# Patient Record
Sex: FEMALE | Race: WHITE | NOT HISPANIC OR LATINO | Employment: FULL TIME | URBAN - METROPOLITAN AREA
[De-identification: names, ages, dates, MRNs, and addresses within clinical notes are randomized per-mention and may not be internally consistent; named-entity substitution may affect disease eponyms.]

---

## 2017-07-05 ENCOUNTER — HISTORICAL (OUTPATIENT)
Dept: ADMINISTRATIVE | Facility: HOSPITAL | Age: 27
End: 2017-07-05

## 2018-06-06 ENCOUNTER — HISTORICAL (OUTPATIENT)
Dept: ADMINISTRATIVE | Facility: HOSPITAL | Age: 28
End: 2018-06-06

## 2018-06-06 LAB
ABS NEUT (OLG): 7.33 X10(3)/MCL (ref 2.1–9.2)
BASOPHILS # BLD AUTO: 0 X10(3)/MCL (ref 0–0.2)
BASOPHILS NFR BLD AUTO: 0 %
EOSINOPHIL # BLD AUTO: 0 X10(3)/MCL (ref 0–0.9)
EOSINOPHIL NFR BLD AUTO: 0 %
ERYTHROCYTE [DISTWIDTH] IN BLOOD BY AUTOMATED COUNT: 12.9 % (ref 11.5–17)
FT4I SERPL CALC-MCNC: 3.35 (ref 2.6–3.6)
GROUP & RH: NORMAL
HBV SURFACE AG SERPL QL IA: NEGATIVE
HCT VFR BLD AUTO: 40.4 % (ref 37–47)
HGB BLD-MCNC: 13 GM/DL (ref 12–16)
HIV 1+2 AB+HIV1 P24 AG SERPL QL IA: NEGATIVE
LYMPHOCYTES # BLD AUTO: 1.8 X10(3)/MCL (ref 0.6–4.6)
LYMPHOCYTES NFR BLD AUTO: 18 %
MCH RBC QN AUTO: 28.3 PG (ref 27–31)
MCHC RBC AUTO-ENTMCNC: 32.2 GM/DL (ref 33–36)
MCV RBC AUTO: 88 FL (ref 80–94)
MONOCYTES # BLD AUTO: 0.6 X10(3)/MCL (ref 0.1–1.3)
MONOCYTES NFR BLD AUTO: 7 %
NEUTROPHILS # BLD AUTO: 7.33 X10(3)/MCL (ref 1.4–7.9)
NEUTROPHILS NFR BLD AUTO: 74 %
PAP RECOMMENDATION EXT: NORMAL
PAP SMEAR: NORMAL
PLATELET # BLD AUTO: 264 X10(3)/MCL (ref 130–400)
PMV BLD AUTO: 10 FL (ref 9.4–12.4)
RBC # BLD AUTO: 4.59 X10(6)/MCL (ref 4.2–5.4)
RPR SER QL: NORMAL
T3RU NFR SERPL: 31 % (ref 31–39)
T4 FREE SERPL-MCNC: 0.94 NG/DL (ref 0.76–1.46)
T4 SERPL-MCNC: 10.8 MCG/DL (ref 4.7–13.3)
TSH SERPL-ACNC: 3.28 MIU/L (ref 0.36–3.74)
WBC # SPEC AUTO: 9.9 X10(3)/MCL (ref 4.5–11.5)

## 2018-06-08 LAB — FINAL CULTURE: NO GROWTH

## 2018-07-05 ENCOUNTER — HISTORICAL (OUTPATIENT)
Dept: ADMINISTRATIVE | Facility: HOSPITAL | Age: 28
End: 2018-07-05

## 2018-07-05 LAB — GROUP & RH: NORMAL

## 2018-08-28 ENCOUNTER — HISTORICAL (OUTPATIENT)
Dept: RADIOLOGY | Facility: HOSPITAL | Age: 28
End: 2018-08-28

## 2018-10-24 ENCOUNTER — HISTORICAL (OUTPATIENT)
Dept: LAB | Facility: HOSPITAL | Age: 28
End: 2018-10-24

## 2018-10-24 LAB
ERYTHROCYTE [DISTWIDTH] IN BLOOD BY AUTOMATED COUNT: 12.5 % (ref 11.5–17)
GLUCOSE 1H P 100 G GLC PO SERPL-MCNC: 135 MG/DL (ref 100–180)
HCT VFR BLD AUTO: 33 % (ref 37–47)
HGB BLD-MCNC: 10.7 GM/DL (ref 12–16)
MCH RBC QN AUTO: 28.9 PG (ref 27–31)
MCHC RBC AUTO-ENTMCNC: 32.4 GM/DL (ref 33–36)
MCV RBC AUTO: 89.2 FL (ref 80–94)
PLATELET # BLD AUTO: 219 X10(3)/MCL (ref 130–400)
PMV BLD AUTO: 10.5 FL (ref 9.4–12.4)
RBC # BLD AUTO: 3.7 X10(6)/MCL (ref 4.2–5.4)
WBC # SPEC AUTO: 10.1 X10(3)/MCL (ref 4.5–11.5)

## 2018-11-01 ENCOUNTER — HOSPITAL ENCOUNTER (OUTPATIENT)
Dept: OBSTETRICS AND GYNECOLOGY | Facility: HOSPITAL | Age: 28
End: 2018-11-01
Attending: OBSTETRICS & GYNECOLOGY | Admitting: OBSTETRICS & GYNECOLOGY

## 2018-12-23 ENCOUNTER — HOSPITAL ENCOUNTER (OUTPATIENT)
Dept: OBSTETRICS AND GYNECOLOGY | Facility: HOSPITAL | Age: 28
End: 2018-12-23
Attending: OBSTETRICS & GYNECOLOGY | Admitting: OBSTETRICS & GYNECOLOGY

## 2019-01-03 ENCOUNTER — HISTORICAL (OUTPATIENT)
Dept: LAB | Facility: HOSPITAL | Age: 29
End: 2019-01-03

## 2019-01-05 LAB — FINAL CULTURE: NORMAL

## 2021-04-25 ENCOUNTER — HISTORICAL (OUTPATIENT)
Dept: LAB | Facility: HOSPITAL | Age: 31
End: 2021-04-25

## 2021-04-25 LAB — SARS-COV-2 AG RESP QL IA.RAPID: POSITIVE

## 2021-09-09 ENCOUNTER — HISTORICAL (OUTPATIENT)
Dept: LAB | Facility: HOSPITAL | Age: 31
End: 2021-09-09

## 2021-09-09 LAB
ABS NEUT (OLG): 4.88 X10(3)/MCL (ref 2.1–9.2)
B-HCG SERPL QL: NEGATIVE
BASOPHILS # BLD AUTO: 0.05 X10(3)/MCL (ref 0–0.2)
BASOPHILS NFR BLD AUTO: 0.7 % (ref 0–1)
BUN SERPL-MCNC: 12.6 MG/DL (ref 7–18.7)
CALCIUM SERPL-MCNC: 9.2 MG/DL (ref 8.4–10.2)
CHLORIDE SERPL-SCNC: 105 MMOL/L (ref 98–107)
CO2 SERPL-SCNC: 23 MMOL/L (ref 22–29)
CREAT SERPL-MCNC: 0.74 MG/DL (ref 0.57–1.11)
CREAT/UREA NIT SERPL: 17
EOSINOPHIL # BLD AUTO: 0.07 X10(3)/MCL (ref 0–0.9)
EOSINOPHIL NFR BLD AUTO: 1 % (ref 0–6.4)
ERYTHROCYTE [DISTWIDTH] IN BLOOD BY AUTOMATED COUNT: 14 % (ref 11.5–17)
GLUCOSE SERPL-MCNC: 86 MG/DL (ref 74–100)
HCT VFR BLD AUTO: 36.3 % (ref 37–47)
HGB BLD-MCNC: 11.1 GM/DL (ref 12–16)
IMM GRANULOCYTES # BLD AUTO: 0.01 10*3/UL (ref 0–0.02)
IMM GRANULOCYTES NFR BLD AUTO: 0.1 % (ref 0–0.43)
LYMPHOCYTES # BLD AUTO: 1.82 X10(3)/MCL (ref 0.6–4.6)
LYMPHOCYTES NFR BLD AUTO: 25.1 % (ref 16–44)
MCH RBC QN AUTO: 24.8 PG (ref 27–31)
MCHC RBC AUTO-ENTMCNC: 30.6 GM/DL (ref 33–36)
MCV RBC AUTO: 81 FL (ref 80–94)
MONOCYTES # BLD AUTO: 0.42 X10(3)/MCL (ref 0.1–1.3)
MONOCYTES NFR BLD AUTO: 5.8 % (ref 4–12.1)
NEUTROPHILS # BLD AUTO: 4.88 X10(3)/MCL (ref 2.1–9.2)
NEUTROPHILS NFR BLD AUTO: 67.3 % (ref 43–73)
NRBC BLD AUTO-RTO: 0 % (ref 0–0.2)
PLATELET # BLD AUTO: 306 X10(3)/MCL (ref 130–400)
PMV BLD AUTO: 9.9 FL (ref 7.4–10.4)
POTASSIUM SERPL-SCNC: 3.5 MMOL/L (ref 3.5–5.1)
RBC # BLD AUTO: 4.48 X10(6)/MCL (ref 4.2–5.4)
SARS-COV-2 AG RESP QL IA.RAPID: NEGATIVE
SODIUM SERPL-SCNC: 139 MMOL/L (ref 136–145)
WBC # SPEC AUTO: 7.2 X10(3)/MCL (ref 4.5–11.5)

## 2021-09-10 ENCOUNTER — HISTORICAL (OUTPATIENT)
Dept: SURGERY | Facility: HOSPITAL | Age: 31
End: 2021-09-10

## 2021-09-20 ENCOUNTER — HISTORICAL (OUTPATIENT)
Dept: ADMINISTRATIVE | Facility: HOSPITAL | Age: 31
End: 2021-09-20

## 2021-10-18 ENCOUNTER — HISTORICAL (OUTPATIENT)
Dept: ADMINISTRATIVE | Facility: HOSPITAL | Age: 31
End: 2021-10-18

## 2021-11-15 ENCOUNTER — HISTORICAL (OUTPATIENT)
Dept: ADMINISTRATIVE | Facility: HOSPITAL | Age: 31
End: 2021-11-15

## 2022-03-17 ENCOUNTER — HISTORICAL (OUTPATIENT)
Dept: LAB | Facility: HOSPITAL | Age: 32
End: 2022-03-17

## 2022-03-17 LAB
ABS NEUT (OLG): 5.51 (ref 2.1–9.2)
ALBUMIN SERPL-MCNC: 4 G/DL (ref 3.5–5)
ALBUMIN/GLOB SERPL: 1.4 {RATIO} (ref 1.1–2)
ALP SERPL-CCNC: 61 U/L (ref 40–150)
ALT SERPL-CCNC: 10 U/L (ref 0–55)
AST SERPL-CCNC: 12 U/L (ref 5–34)
BASOPHILS # BLD AUTO: 0.1 10*3/UL (ref 0–0.2)
BASOPHILS NFR BLD AUTO: 1 %
BILIRUB SERPL-MCNC: 0.4 MG/DL
BILIRUBIN DIRECT+TOT PNL SERPL-MCNC: 0.2 (ref 0–0.5)
BILIRUBIN DIRECT+TOT PNL SERPL-MCNC: 0.2 (ref 0–0.8)
BUN SERPL-MCNC: 8.2 MG/DL (ref 7–18.7)
CALCIUM SERPL-MCNC: 9.4 MG/DL (ref 8.7–10.5)
CHLORIDE SERPL-SCNC: 106 MMOL/L (ref 98–107)
CHOLEST SERPL-MCNC: 195 MG/DL
CHOLEST/HDLC SERPL: 5 {RATIO} (ref 0–5)
CO2 SERPL-SCNC: 27 MMOL/L (ref 22–29)
CREAT SERPL-MCNC: 0.68 MG/DL (ref 0.55–1.02)
EOSINOPHIL # BLD AUTO: 0.1 10*3/UL (ref 0–0.9)
EOSINOPHIL NFR BLD AUTO: 1 %
ERYTHROCYTE [DISTWIDTH] IN BLOOD BY AUTOMATED COUNT: 13.7 % (ref 11.5–17)
GLOBULIN SER-MCNC: 2.9 G/DL (ref 2.4–3.5)
GLUCOSE SERPL-MCNC: 74 MG/DL (ref 74–100)
HCT VFR BLD AUTO: 35 % (ref 37–47)
HDLC SERPL-MCNC: 40 MG/DL (ref 35–60)
HEMOLYSIS INTERF INDEX SERPL-ACNC: 1
HGB BLD-MCNC: 10.6 G/DL (ref 12–16)
ICTERIC INTERF INDEX SERPL-ACNC: 0
IRON SATN MFR SERPL: NORMAL % (ref 20–50)
IRON SERPL-MCNC: <5 UG/DL (ref 50–170)
LDLC SERPL CALC-MCNC: 132 MG/DL (ref 50–140)
LIPEMIC INTERF INDEX SERPL-ACNC: 4
LYMPHOCYTES # BLD AUTO: 3 10*3/UL (ref 0.6–4.6)
LYMPHOCYTES NFR BLD AUTO: 32 %
MANUAL DIFF? (OHS): NO
MCH RBC QN AUTO: 24.5 PG (ref 27–31)
MCHC RBC AUTO-ENTMCNC: 30.3 G/DL (ref 33–36)
MCV RBC AUTO: 80.8 FL (ref 80–94)
MONOCYTES # BLD AUTO: 0.7 10*3/UL (ref 0.1–1.3)
MONOCYTES NFR BLD AUTO: 8 %
NEUTROPHILS # BLD AUTO: 5.51 10*3/UL (ref 1.4–7.9)
NEUTROPHILS NFR BLD AUTO: 59 %
PLATELET # BLD AUTO: 411 10*3/UL (ref 130–400)
PMV BLD AUTO: 10.5 FL (ref 9.4–12.4)
POTASSIUM SERPL-SCNC: 4 MMOL/L (ref 3.5–5.1)
PROT SERPL-MCNC: 6.9 G/DL (ref 6.4–8.3)
RBC # BLD AUTO: 4.33 10*6/UL (ref 4.2–5.4)
SODIUM SERPL-SCNC: 140 MMOL/L (ref 136–145)
T3FREE SERPL-MCNC: 3.03 PG/ML (ref 1.58–3.91)
T4 FREE SERPL-MCNC: 0.91 NG/DL (ref 0.7–1.48)
TIBC SERPL-MCNC: 349 UG/DL (ref 70–310)
TIBC SERPL-MCNC: <354 UG/DL (ref 250–450)
TRANSFERRIN SERPL-MCNC: 346 MG/DL (ref 180–382)
TRIGL SERPL-MCNC: 114 MG/DL (ref 37–140)
TSH SERPL-ACNC: 3.52 M[IU]/L (ref 0.35–4.94)
VLDLC SERPL CALC-MCNC: 23 MG/DL
WBC # SPEC AUTO: 9.3 10*3/UL (ref 4.5–11.5)

## 2022-03-23 ENCOUNTER — HISTORICAL (OUTPATIENT)
Dept: INFUSION THERAPY | Facility: HOSPITAL | Age: 32
End: 2022-03-23

## 2022-03-31 ENCOUNTER — HISTORICAL (OUTPATIENT)
Dept: LAB | Facility: HOSPITAL | Age: 32
End: 2022-03-31

## 2022-03-31 LAB
DEPRECATED CALCIDIOL+CALCIFEROL SERPL-MC: 27.7 NG/ML (ref 30–80)
IRON SATN MFR SERPL: 15 % (ref 20–50)
IRON SERPL-MCNC: 48 UG/DL (ref 50–170)
TIBC SERPL-MCNC: 271 UG/DL (ref 70–310)
TIBC SERPL-MCNC: 319 UG/DL (ref 250–450)
TRANSFERRIN SERPL-MCNC: 282 MG/DL (ref 180–382)

## 2022-04-04 ENCOUNTER — HISTORICAL (OUTPATIENT)
Dept: INFUSION THERAPY | Facility: HOSPITAL | Age: 32
End: 2022-04-04

## 2022-04-10 ENCOUNTER — HISTORICAL (OUTPATIENT)
Dept: ADMINISTRATIVE | Facility: HOSPITAL | Age: 32
End: 2022-04-10

## 2022-04-29 VITALS
BODY MASS INDEX: 29.38 KG/M2 | WEIGHT: 187.19 LBS | OXYGEN SATURATION: 97 % | SYSTOLIC BLOOD PRESSURE: 105 MMHG | HEIGHT: 67 IN | DIASTOLIC BLOOD PRESSURE: 75 MMHG

## 2022-05-04 NOTE — HISTORICAL [facility]
This is a historical note converted from [facility]. Formatting and pictures may have been removed.  Please reference Mckay for original formatting and attached multimedia. Chief Complaint  4 week f/u ORIF R distal radius 9/10/2021, pt states pain is better a little, no other changes  History of Present Illness  Patient returns today for repeat exam. ?Patient is 6 weeks from her right distal radius fracture fixation.? Patient states the pain is better she is coming out of her Velcro splint. ?She continues to have some difficulty with supination  Physical Exam  Vitals & Measurements  HT:?170.00?cm? WT:?84.900?kg? BMI:?29.38?  Right upper extremity compartment soft and warm. ?Skin is intact. ?There are no signs or symptoms of DVT or infection. ?Her incisions well-healed there is no obvious swelling or erythema.? She has some tenderness along the dorsal aspect of the wrist joint. ?There is no obvious swelling or erythema?he does have some stiffness with full supination, she does have full pronation.? There is no instability, neurovascular tact distally. ?X-rays 3 views the right wrist demonstrate a healed distal radius fracture with hardware in appropriate position.  Assessment/Plan  1.?Distal radius fracture, right?S52.501A  ?At this time discussed her physical exam and x-ray findings. ?She has healed her fracture nicely. ?We will start formal therapy?to regain her full strength and motion. 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 ?I like to see her back in 4 weeks to see how she is progressing.  Ordered:  Clinic Follow up, *Est. 11/15/21 3:00:00 CST, Order for future visit, Distal radius fracture, right, [facility]  Clinic Follow-up PRN, 10/18/21 8:56:00 CDT, Future Order, [facility]  Post-Op follow-up visit [id number] PC, Distal radius fracture, right, [facility], 10/18/21 8:56:00 CDT  ?  Referrals  PT/OT Ambulatory  Referral, Specialty: Physical Therapy, Start: 10/18/21 8:59:00 CDT, 4, Evaluate and Treat, Distal radius fracture, right, 3 X Week  Clinic Follow up, *Est. 11/15/21 3:00:00 CST, Order for future visit, Distal radius fracture, right, [facility]  Clinic Follow-up PRN, 10/18/21 8:56:00 CDT, Future Order, [facility]   Problem List/Past Medical History  Ongoing  2019-nCoV  Historical  Hypothyroid  Hypothyroidism  Miscarriage  NORMAL PREGNANCY  Pregnant  Pregnant  Pregnant  Pregnant  Procedure/Surgical History  Injection(s), anesthetic agent(s) and/or steroid; axillary nerve (09/10/2021)  Introduction of Anesthetic Agent into Peripheral Nerves and Plexi, Percutaneous Approach (09/10/2021)  Open treatment of distal radial extra-articular fracture or epiphyseal separation, with internal fixation (09/10/2021)  ORIF Radius Distal (Right) (09/10/2021)  Reposition Right Radius with Internal Fixation Device, Open Approach (09/10/2021)  Closed treatment of distal radial fracture (eg, Colles or Smith type) or epiphyseal separation, includes closed treatment of fracture of ulnar styloid, when performed; with manipulation (2021)  Reposition Right Radius, External Approach (2021)   delivery only; (2019)   Section (2019)  Extraction of Products of Conception, Low, Open Approach (2019)  Medical induction of labor (2015)  Repair of other current obstetric laceration (2015)  DIRECT ADMISSION OF PATIENT FOR HOSPITAL OBSERVATION CARE (2015)  HOSPITAL OBSERVATION SERVICE, PER HOUR (2015)  DIRECT ADMISSION OF PATIENT FOR HOSPITAL OBSERVATION CARE (2014)  HOSPITAL OBSERVATION SERVICE, PER HOUR (2014)  DIRECT ADMISSION OF PATIENT FOR HOSPITAL OBSERVATION CARE (2014)  HOSPITAL OBSERVATION SERVICE, PER HOUR (2014)  Repair of other current obstetric laceration (2013)  DIRECT ADMISSION OF PATIENT FOR HOSPITAL OBSERVATION CARE  (2013)  HOSPITAL OBSERVATION SERVICE, PER HOUR (2013)   (2013)  dilation and cutterage (2012)  Aspiration curettage following delivery or  (2012)  HOSPITAL OBSERVATION SERVICE, PER HOUR (2012)  Treatment of missed , completed surgically; first trimester. (2012)  D&C ()   Medications  Normal Saline Infusion, 1000 mL, IV, Now  Percocet 5/325 oral tablet, 1 tab(s), Oral, q6hr, PRN,? ?Not taking  Allergies  No Known Allergies  Social History  Abuse/Neglect  No, No, Yes, 10/18/2021  Alcohol - Denies Alcohol Use, 2012  Employment/School - Low Risk, 2015  Substance Use - Denies Substance Abuse, 2012  Tobacco - Denies Tobacco Use, 2012  Never (less than 100 in lifetime), N/A, 10/18/2021  Family History  Hypothyroidism: Mother.  Hypothyroidism.: Mother.  Pituitary tumor: Sister.  Immunizations  Vaccine Date Status   COVID-19 MRNA, LNP-S, PF- Pfizer 2021 Given   COVID-19 MRNA, LNP-S, PF- Pfizer 2021 Given   influenza virus vaccine, inactivated 10/03/2019 Given   Health Maintenance  Health Maintenance  ???Pending?(in the next year)  ??? ??OverDue  ??? ? ? ?Blood Pressure Screening due??19??and every 1??year(s)  ??? ? ? ?TB Skin Test due??10/05/20??and every 1??year(s)  ??? ? ? ?Alcohol Misuse Screening due??21??and every 1??year(s)  ??? ? ? ?Cervical Cancer Screening due??21??and every 3??year(s)  ??? ??Due?  ??? ? ? ?ADL Screening due??10/18/21??and every 1??year(s)  ??? ? ? ?Tetanus Vaccine due??10/18/21??and every 10??year(s)  ??? ??Due In Future?  ??? ? ? ?Obesity Screening not due until??01/01/22??and every 1??year(s)  ??? ? ? ?Depression Screening not due until??22??and every 1??year(s)  ??? ? ? ?Body Mass Index Check not due until??22??and every 1??year(s)  ???Satisfied?(in the past 1 year)  ??? ??Satisfied?  ??? ? ? ?Blood Pressure Screening on??09/10/21.??Satisfied by Diane Ruff RN  ??? ? ?  ?Body Mass Index Check on??10/18/21.??Satisfied by Nina Dang  ??? ? ? ?Depression Screening on??10/18/21.??Satisfied by Nina Dang  ??? ? ? ?Diabetes Screening on??09/09/21.??Satisfied by Jess Wright  ??? ? ? ?Influenza Vaccine on??10/18/21.??Satisfied by Nina Dang  ??? ? ? ?Obesity Screening on??10/18/21.??Satisfied by Nina Dang  ?

## 2022-05-04 NOTE — HISTORICAL [facility]
This is a historical note converted from [facility]. Formatting and pictures may have been removed.  Please reference M[facility]ay for original formatting and attached multimedia. Chief Complaint  f/u ORIF R distal radius 9/10/2021- GL 12/9/2021 - pt states that her wrist is doing better - td  History of Present Illness  Patient returns today for repeat exam. ?Patient is 2 months from her right distal radius fracture fixation. ?Patient states she is doing well?very minimal, occasional pain.? She is not take any pain medications. ?She has been out of her splint.  Physical Exam  Vitals & Measurements  HT:?170.00?cm? WT:?84.900?kg? BMI:?29.38?  Right wrist her incisions well-healed there is no signs or symptoms of DVT or infection. ?She has full pronation. ?She is a little bit short of full?supination. ?There is no crepitance,?she has?otherwise?able to make a full fist, full extension, neurovascular tact distally. ?X-rays 3 views right wrist demonstrate a healed distal radius fracture, hardware in appropriate position.  Assessment/Plan  1.?Distal radius fracture, right?S52.501A  ?At this time we discussed her physical exam and x-ray findings. ?She is healed nicely she will continue weight-bear as tolerated, I would like to see her back with any problems or difficulties.  Ordered:  Clinic Follow-up PRN, 11/15/21 8:44:00 CST, Future Order, [facility]  Post-Op follow-up visit [id number] PC, Distal radius fracture, right, [facility], 11/15/21 8:44:00 CST  ?  Referrals  Clinic Follow-up PRN, 11/15/21 8:44:00 CST, Future Order, [facility]   Problem List/Past Medical History  Ongoing  2019-nCoV  Historical  Hypothyroid  Hypothyroidism  Miscarriage  NORMAL PREGNANCY  Pregnant  Pregnant  Pregnant  Pregnant  Procedure/Surgical History  Injection(s), anesthetic agent(s) and/or steroid; axillary nerve (09/10/2021)  Introduction of Anesthetic Agent into Peripheral Nerves and Plexi, Percutaneous Approach (09/10/2021)  Open  treatment of distal radial extra-articular fracture or epiphyseal separation, with internal fixation (09/10/2021)  ORIF Radius Distal (Right) (09/10/2021)  Reposition Right Radius with Internal Fixation Device, Open Approach (09/10/2021)  Closed treatment of distal radial fracture (eg, Colles or Smith type) or epiphyseal separation, includes closed treatment of fracture of ulnar styloid, when performed; with manipulation (2021)  Reposition Right Radius, External Approach (2021)   delivery only; (2019)   Section (2019)  Extraction of Products of Conception, Low, Open Approach (2019)  Medical induction of labor (2015)  Repair of other current obstetric laceration (2015)  DIRECT ADMISSION OF PATIENT FOR HOSPITAL OBSERVATION CARE (2015)  HOSPITAL OBSERVATION SERVICE, PER HOUR (2015)  DIRECT ADMISSION OF PATIENT FOR HOSPITAL OBSERVATION CARE (2014)  HOSPITAL OBSERVATION SERVICE, PER HOUR (2014)  DIRECT ADMISSION OF PATIENT FOR HOSPITAL OBSERVATION CARE (2014)  HOSPITAL OBSERVATION SERVICE, PER HOUR (2014)  Repair of other current obstetric laceration (2013)  DIRECT ADMISSION OF PATIENT FOR HOSPITAL OBSERVATION CARE (2013)  HOSPITAL OBSERVATION SERVICE, PER HOUR (2013)   ()  dilation and cutterage (2012)  Aspiration curettage following delivery or  (2012)  HOSPITAL OBSERVATION SERVICE, PER HOUR (2012)  Treatment of missed , completed surgically; first trimester. (2012)  D&C ()   Medications  Normal Saline Infusion, 1000 mL, IV, Now  Percocet 5/325 oral tablet, 1 tab(s), Oral, q6hr, PRN,? ?Not taking  Allergies  No Known Allergies  Social History  Abuse/Neglect  No, No, Yes, 11/15/2021  Alcohol - Denies Alcohol Use, 2012  Employment/School - Low Risk, 2015  Substance Use - Denies Substance Abuse, 2012  Tobacco - Denies Tobacco Use,  08/28/2012  Never (less than 100 in lifetime), N/A, 11/15/2021  Family History  Hypothyroidism: Mother.  Hypothyroidism.: Mother.  Pituitary tumor: Sister.  Immunizations  Vaccine Date Status   COVID-19 MRNA, LNP-S, PF- Pfizer 08/31/2021 Given   COVID-19 MRNA, LNP-S, PF- Pfizer 08/09/2021 Given   influenza virus vaccine, inactivated 10/03/2019 Given   Health Maintenance  Health Maintenance  ???Pending?(in the next year)  ??? ??OverDue  ??? ? ? ?Blood Pressure Screening due??11/20/19??and every 1??year(s)  ??? ? ? ?TB Skin Test due??10/05/20??and every 1??year(s)  ??? ? ? ?Alcohol Misuse Screening due??01/02/21??and every 1??year(s)  ??? ? ? ?Cervical Cancer Screening due??06/05/21??and every 3??year(s)  ??? ??Due?  ??? ? ? ?ADL Screening due??11/15/21??and every 1??year(s)  ??? ? ? ?Tetanus Vaccine due??11/15/21??and every 10??year(s)  ??? ??Due In Future?  ??? ? ? ?Obesity Screening not due until??01/01/22??and every 1??year(s)  ???Satisfied?(in the past 1 year)  ??? ??Satisfied?  ??? ? ? ?Blood Pressure Screening on??09/10/21.??Satisfied by Diane Ruff RN  ??? ? ? ?Body Mass Index Check on??11/15/21.??Satisfied by Ailyn Bradley  ??? ? ? ?Depression Screening on??11/15/21.??Satisfied by Ailyn Bradley  ??? ? ? ?Diabetes Screening on??09/09/21.??Satisfied by Jess Wright  ??? ? ? ?Influenza Vaccine on??11/15/21.??Satisfied by Ailyn Bradley  ??? ? ? ?Obesity Screening on??11/15/21.??Satisfied by Ailyn Bradley  ?

## 2022-05-04 NOTE — HISTORICAL [facility]
This is a historical note converted from [facility]. Formatting and pictures may have been removed.  Please reference Mckay for original formatting and attached multimedia. Chief Complaint  post op ORIF R distal radius 9/10/01 - 12-9-21 - pt is in a brace today - she states that her wrist is feeling good - TD  History of Present Illness  Patient comes in today for her first postop visit. ?Patient states she is doing well she is occasionally taking Tylenol she has been in a Velcro splint.  Physical Exam  Vitals & Measurements  HT:?170.00?cm? WT:?84.900?kg? BMI:?29.38?  Right upper extremity compartment soft and warm. ?Skin is intact. ?There is no signs or symptoms of DVT or infection.? Her incision is healed she has appropriate pronation supination flexion-extension there is no crepitance stability?she is neurovascular tact distally. ?X-rays 3 views right wrist demonstrate a well aligned distal radius fracture with hardware in appropriate position.  Assessment/Plan  1.?Distal radius fracture, right?S52.501A  ?At this time we discussed her physical exam and x-ray findings. ?She is healing nicely.? She will refrain from any lifting we have discussed some wrist exercises, continuing?with her wrist splint. ?I like to see her back in 4 weeks for repeat exam including x-rays.  Ordered:  Clinic Follow up, *Est. 10/18/21 3:00:00 CDT, Order for future visit, Distal radius fracture, right, [facility]  Post-Op follow-up visit [id number] PC, Distal radius fracture, right, [facility], 09/20/21 8:56:00 CDT  ?  Referrals  Clinic Follow up, *Est. 10/18/21 3:00:00 CDT, Order for future visit, Distal radius fracture, right, [facility]   Problem List/Past Medical History  Ongoing  2019-nCoV  Historical  Hypothyroid  Hypothyroidism  Miscarriage  NORMAL PREGNANCY  Pregnant  Pregnant  Pregnant  Pregnant  Procedure/Surgical History  Injection(s), anesthetic agent(s) and/or steroid; axillary nerve (09/10/2021)  Introduction of  Anesthetic Agent into Peripheral Nerves and Plexi, Percutaneous Approach (09/10/2021)  Open treatment of distal radial extra-articular fracture or epiphyseal separation, with internal fixation (09/10/2021)  ORIF Radius Distal (Right) (09/10/2021)  Reposition Right Radius with Internal Fixation Device, Open Approach (09/10/2021)  Closed treatment of distal radial fracture (eg, Colles or Smith type) or epiphyseal separation, includes closed treatment of fracture of ulnar styloid, when performed; with manipulation (2021)  Reposition Right Radius, External Approach (2021)   delivery only; (2019)   Section (2019)  Extraction of Products of Conception, Low, Open Approach (2019)  Medical induction of labor (2015)  Repair of other current obstetric laceration (2015)  DIRECT ADMISSION OF PATIENT FOR HOSPITAL OBSERVATION CARE (2015)  HOSPITAL OBSERVATION SERVICE, PER HOUR (2015)  DIRECT ADMISSION OF PATIENT FOR HOSPITAL OBSERVATION CARE (2014)  HOSPITAL OBSERVATION SERVICE, PER HOUR (2014)  DIRECT ADMISSION OF PATIENT FOR HOSPITAL OBSERVATION CARE (2014)  HOSPITAL OBSERVATION SERVICE, PER HOUR (2014)  Repair of other current obstetric laceration (2013)  DIRECT ADMISSION OF PATIENT FOR HOSPITAL OBSERVATION CARE (2013)  HOSPITAL OBSERVATION SERVICE, PER HOUR (2013)   ()  dilation and cutterage (2012)  Aspiration curettage following delivery or  (2012)  HOSPITAL OBSERVATION SERVICE, PER HOUR (2012)  Treatment of missed , completed surgically; first trimester. (2012)  D&C ()   Medications  Normal Saline Infusion, 1000 mL, IV, Now  Percocet 5/325 oral tablet, 1 tab(s), Oral, q6hr, PRN,? ?Not taking  Allergies  No Known Allergies  Social History  Abuse/Neglect  No, No, Yes, 2021  Alcohol - Denies Alcohol Use, 2012  Employment/School - Low Risk,  01/30/2015  Substance Use - Denies Substance Abuse, 08/28/2012  Tobacco - Denies Tobacco Use, 08/28/2012  Never (less than 100 in lifetime), N/A, 09/20/2021  Family History  Hypothyroidism: Mother.  Hypothyroidism.: Mother.  Pituitary tumor: Sister.  Immunizations  Vaccine Date Status   COVID-19 MRNA, LNP-S, PF- Pfizer 08/31/2021 Given   COVID-19 MRNA, LNP-S, PF- Pfizer 08/09/2021 Given   influenza virus vaccine, inactivated 10/03/2019 Given   Health Maintenance  Health Maintenance  ???Pending?(in the next year)  ??? ??OverDue  ??? ? ? ?Blood Pressure Screening due??11/20/19??and every 1??year(s)  ??? ? ? ?TB Skin Test due??10/05/20??and every 1??year(s)  ??? ? ? ?Alcohol Misuse Screening due??01/02/21??and every 1??year(s)  ??? ? ? ?Cervical Cancer Screening due??06/05/21??and every 3??year(s)  ??? ??Due?  ??? ? ? ?ADL Screening due??09/20/21??and every 1??year(s)  ??? ? ? ?Tetanus Vaccine due??09/20/21??and every 10??year(s)  ??? ??Due In Future?  ??? ? ? ?Obesity Screening not due until??01/01/22??and every 1??year(s)  ??? ? ? ?Depression Screening not due until??09/09/22??and every 1??year(s)  ???Satisfied?(in the past 1 year)  ??? ??Satisfied?  ??? ? ? ?Blood Pressure Screening on??09/10/21.??Satisfied by Diane Ruff RN  ??? ? ? ?Body Mass Index Check on??09/20/21.??Satisfied by Ailyn Bradley  ??? ? ? ?Depression Screening on??09/09/21.??Satisfied by Nina Dang  ??? ? ? ?Diabetes Screening on??09/09/21.??Satisfied by Jess Wright  ??? ? ? ?Influenza Vaccine on??09/20/21.??Satisfied by Ailyn Bradley  ??? ? ? ?Obesity Screening on??09/20/21.??Satisfied by Ailyn Bradley  ?

## 2022-06-08 DIAGNOSIS — D50.0 IRON DEFICIENCY ANEMIA DUE TO CHRONIC BLOOD LOSS: Primary | ICD-10-CM

## 2022-08-15 ENCOUNTER — TELEPHONE (OUTPATIENT)
Dept: FAMILY MEDICINE | Facility: CLINIC | Age: 32
End: 2022-08-15
Payer: COMMERCIAL

## 2022-08-15 DIAGNOSIS — Z11.59 NEED FOR HEPATITIS C SCREENING TEST: ICD-10-CM

## 2022-08-15 DIAGNOSIS — D50.0 IRON DEFICIENCY ANEMIA DUE TO CHRONIC BLOOD LOSS: Primary | ICD-10-CM

## 2022-08-15 PROBLEM — E03.9 HYPOTHYROIDISM: Status: ACTIVE | Noted: 2022-08-15

## 2022-08-15 NOTE — TELEPHONE ENCOUNTER
Her wellness was in March. She had very low iron and I sent her for iron infusions. She was scheduled for 3 month follow up labs and visit in June. This must be a reschedule. Just needs labs I ordered.

## 2022-08-15 NOTE — TELEPHONE ENCOUNTER
No answer / left message on 8/15/22 at 10:08 to remind patient about wellness visit and labs needed.    Please order wellness labs needed for visit

## 2022-08-22 ENCOUNTER — OFFICE VISIT (OUTPATIENT)
Dept: FAMILY MEDICINE | Facility: CLINIC | Age: 32
End: 2022-08-22
Payer: COMMERCIAL

## 2022-08-22 VITALS
SYSTOLIC BLOOD PRESSURE: 108 MMHG | TEMPERATURE: 98 F | BODY MASS INDEX: 28 KG/M2 | OXYGEN SATURATION: 98 % | WEIGHT: 178.38 LBS | HEIGHT: 67 IN | DIASTOLIC BLOOD PRESSURE: 80 MMHG | RESPIRATION RATE: 16 BRPM | HEART RATE: 95 BPM

## 2022-08-22 DIAGNOSIS — F33.9 DEPRESSION, RECURRENT: ICD-10-CM

## 2022-08-22 DIAGNOSIS — D50.0 IRON DEFICIENCY ANEMIA DUE TO CHRONIC BLOOD LOSS: Primary | ICD-10-CM

## 2022-08-22 DIAGNOSIS — N92.0 MENORRHAGIA WITH REGULAR CYCLE: ICD-10-CM

## 2022-08-22 DIAGNOSIS — Z13.31 POSITIVE DEPRESSION SCREENING: ICD-10-CM

## 2022-08-22 PROBLEM — F32.A DEPRESSION: Status: ACTIVE | Noted: 2022-08-22

## 2022-08-22 PROCEDURE — 3079F PR MOST RECENT DIASTOLIC BLOOD PRESSURE 80-89 MM HG: ICD-10-PCS | Mod: CPTII,,, | Performed by: NURSE PRACTITIONER

## 2022-08-22 PROCEDURE — 1159F PR MEDICATION LIST DOCUMENTED IN MEDICAL RECORD: ICD-10-PCS | Mod: CPTII,,, | Performed by: NURSE PRACTITIONER

## 2022-08-22 PROCEDURE — 3074F SYST BP LT 130 MM HG: CPT | Mod: CPTII,,, | Performed by: NURSE PRACTITIONER

## 2022-08-22 PROCEDURE — 3008F PR BODY MASS INDEX (BMI) DOCUMENTED: ICD-10-PCS | Mod: CPTII,,, | Performed by: NURSE PRACTITIONER

## 2022-08-22 PROCEDURE — 1160F PR REVIEW ALL MEDS BY PRESCRIBER/CLIN PHARMACIST DOCUMENTED: ICD-10-PCS | Mod: CPTII,,, | Performed by: NURSE PRACTITIONER

## 2022-08-22 PROCEDURE — 1159F MED LIST DOCD IN RCRD: CPT | Mod: CPTII,,, | Performed by: NURSE PRACTITIONER

## 2022-08-22 PROCEDURE — 99213 PR OFFICE/OUTPT VISIT, EST, LEVL III, 20-29 MIN: ICD-10-PCS | Mod: ,,, | Performed by: NURSE PRACTITIONER

## 2022-08-22 PROCEDURE — 3074F PR MOST RECENT SYSTOLIC BLOOD PRESSURE < 130 MM HG: ICD-10-PCS | Mod: CPTII,,, | Performed by: NURSE PRACTITIONER

## 2022-08-22 PROCEDURE — 3008F BODY MASS INDEX DOCD: CPT | Mod: CPTII,,, | Performed by: NURSE PRACTITIONER

## 2022-08-22 PROCEDURE — 3079F DIAST BP 80-89 MM HG: CPT | Mod: CPTII,,, | Performed by: NURSE PRACTITIONER

## 2022-08-22 PROCEDURE — 99213 OFFICE O/P EST LOW 20 MIN: CPT | Mod: ,,, | Performed by: NURSE PRACTITIONER

## 2022-08-22 PROCEDURE — 1160F RVW MEDS BY RX/DR IN RCRD: CPT | Mod: CPTII,,, | Performed by: NURSE PRACTITIONER

## 2022-08-22 RX ORDER — ESCITALOPRAM OXALATE 10 MG/1
10 TABLET ORAL DAILY
Qty: 30 TABLET | Refills: 11 | Status: SHIPPED | OUTPATIENT
Start: 2022-08-22 | End: 2022-10-20

## 2022-08-22 RX ORDER — TRIAMCINOLONE ACETONIDE 0.25 MG/G
CREAM TOPICAL 2 TIMES DAILY
COMMUNITY

## 2022-08-22 NOTE — ASSESSMENT & PLAN NOTE
Repeat CBC and Iron panel ordered, patient will complete at work at [facility] [employer]. Feels much better with energy levels.

## 2022-08-22 NOTE — PROGRESS NOTES
Subjective:       Patient ID: Linda Almanzar is a 32 y.o. female.    Chief Complaint: Depression (Follow up from initial visit in March- still having depression)      HPI   This is a 32-year-old white female who presents to clinic today for three-month follow-up.  Patient initially seen by me in March and was having issues with fatigue and depression.  At that time, she was noted to be severely anemic.  Patient with history of very heavy menstrual cycles.  Since then, patient has had 2 infusions of Injectafer.  Feels like her fatigue is much better but she is still having depression symptoms.  Has taken both Celexa and Lexapro in the past which have helped.    Review of Systems  Comprehensive review of systems negative except as stated in HPI    The patient's Health Maintenance was reviewed and the following appears to be due:   Health Maintenance Due   Topic Date Due    Hepatitis C Screening  Never done    COVID-19 Vaccine (3 - Booster for Pfizer series) 2022       Past Medical History:  Past Medical History:   Diagnosis Date    Hypothyroidism, unspecified     Iron deficiency anemia      Past Surgical History:   Procedure Laterality Date     SECTION  2019    DILATION AND CURETTAGE OF UTERUS  2012    OPEN REDUCTION AND INTERNAL FIXATION (ORIF) OF FRACTURE OF DISTAL RADIUS  09/10/2021     Review of patient's allergies indicates:  No Known Allergies  Current Outpatient Medications on File Prior to Visit   Medication Sig Dispense Refill    ferrous sulfate (SLOW FE ORAL) Take by mouth.      triamcinolone acetonide 0.025% (KENALOG) 0.025 % cream Apply topically 2 (two) times daily.       No current facility-administered medications on file prior to visit.     Social History     Socioeconomic History    Marital status:    Tobacco Use    Smoking status: Never Smoker    Smokeless tobacco: Never Used   Substance and Sexual Activity    Alcohol use: Not Currently    Drug use:  "Never    Sexual activity: Yes     Family History   Problem Relation Age of Onset    Hypothyroidism Mother     Arthritis Mother     Endocrine tumor Sister        Objective:       /80 (BP Location: Left arm)   Pulse 95   Temp 98.1 °F (36.7 °C) (Oral)   Resp 16   Ht 5' 7" (1.702 m)   Wt 80.9 kg (178 lb 6.4 oz)   LMP 08/08/2022   SpO2 98%   BMI 27.94 kg/m²      Physical Exam  Vitals and nursing note reviewed.   Constitutional:       Appearance: Normal appearance. She is obese.   HENT:      Head: Normocephalic and atraumatic.      Right Ear: Tympanic membrane, ear canal and external ear normal.      Left Ear: Tympanic membrane, ear canal and external ear normal.      Nose: Nose normal.      Mouth/Throat:      Mouth: Mucous membranes are moist.      Pharynx: Oropharynx is clear.   Eyes:      Extraocular Movements: Extraocular movements intact.      Conjunctiva/sclera: Conjunctivae normal.      Pupils: Pupils are equal, round, and reactive to light.   Cardiovascular:      Rate and Rhythm: Normal rate and regular rhythm.      Heart sounds: Normal heart sounds.   Pulmonary:      Effort: Pulmonary effort is normal.      Breath sounds: Normal breath sounds.   Musculoskeletal:         General: Normal range of motion.      Cervical back: Normal range of motion and neck supple.   Skin:     General: Skin is warm and dry.   Neurological:      General: No focal deficit present.      Mental Status: She is alert and oriented to person, place, and time.   Psychiatric:         Mood and Affect: Mood normal.         Behavior: Behavior normal.         Thought Content: Thought content normal.         Judgment: Judgment normal.         Labs  Historical on 03/31/2022   Component Date Value Ref Range Status    Iron Level 03/31/2022 48  50 - 170 Final    Transferrin 03/31/2022 282  180 - 382 Final    Iron Binding Capacity Total 03/31/2022 319  250 - 450 Final    Iron Binding Capacity Unsaturated 03/31/2022 271  70 - 310 " Final    Iron Saturation 03/31/2022 15  20 - 50 Final    Vit D 25 OH 03/31/2022 27.7  30.0 - 80.0 Final   Historical on 03/17/2022   Component Date Value Ref Range Status    WBC 03/17/2022 9.3  4.5 - 11.5 Final    RBC 03/17/2022 4.33  4.20 - 5.40 Final    Hgb 03/17/2022 10.6  12.0 - 16.0 Final    Hct 03/17/2022 35.0  37.0 - 47.0 Final    MCV 03/17/2022 80.8  80.0 - 94.0 Final    MCH 03/17/2022 24.5  27.0 - 31.0 Final    MCHC 03/17/2022 30.3  33.0 - 36.0 Final    RDW 03/17/2022 13.7  11.5 - 17.0 Final    Platelet 03/17/2022 411  130 - 400 Final    MPV 03/17/2022 10.5  9.4 - 12.4 Final    Abs Neut 03/17/2022 5.51  2.10 - 9.20 Final    Manual Diff? 03/17/2022 No   Final    Neut % 03/17/2022 59   Final    Lymph % 03/17/2022 32   Final    Mono % 03/17/2022 8   Final    Eos % 03/17/2022 1   Final    Basophil % 03/17/2022 1   Final    Lymph # 03/17/2022 3.0  0.6 - 4.6 Final    Neut # 03/17/2022 5.51  1.40 - 7.90 Final    Mono # 03/17/2022 0.7  0.1 - 1.3 Final    Eos # 03/17/2022 0.1  0.0 - 0.9 Final    Baso # 03/17/2022 0.1  0.0 - 0.2 Final    Sodium Level 03/17/2022 140  136 - 145 Final    Potassium Level 03/17/2022 4.0  3.5 - 5.1 Final    Chloride 03/17/2022 106  98 - 107 Final    Carbon Dioxide 03/17/2022 27  22 - 29 Final    Glucose Level 03/17/2022 74  74 - 100 Final    Blood Urea Nitrogen 03/17/2022 8.2  7.0 - 18.7 Final    Creatinine 03/17/2022 0.68  0.55 - 1.02 Final    Calcium Level Total 03/17/2022 9.4  8.7 - 10.5 Final    Albumin Level 03/17/2022 4.0  3.5 - 5.0 Final    Protein Total 03/17/2022 6.9  6.4 - 8.3 Final    Globulin 03/17/2022 2.9  2.4 - 3.5 Final    Albumin/Globulin Ratio 03/17/2022 1.4  1.1 - 2.0 Final    Alkaline Phosphatase 03/17/2022 61  40 - 150 Final    Bilirubin Total 03/17/2022 0.4  <=1.5 Final    Bilirubin Direct 03/17/2022 0.2  0.0 - 0.5 Final    Bilirubin Indirect 03/17/2022 0.20  0.00 - 0.80 Final    Aspartate Aminotransferase 03/17/2022 12  5 - 34  Final    Alanine Aminotransferase 03/17/2022 10  0 - 55 Final    Hemolysis 03/17/2022 1   Final    Icterus 03/17/2022 0   Final    Lipemia 03/17/2022 4   Final    Estimated GFR- 03/17/2022 >60   Final    Estimated GFR-Non  03/17/2022 >60   Final    Thyroid Stimulating Hormone 03/17/2022 3.5240  0.3500 - 4.9400 Final    Thyroxine Free 03/17/2022 0.91  0.70 - 1.48 Final    Cholesterol Total 03/17/2022 195  <=200 Final    HDL Cholesterol 03/17/2022 40  35 - 60 Final    Triglyceride 03/17/2022 114  37 - 140 Final    Very Low Density Lipoprotein 03/17/2022 23   Final    Cholesterol/HDL Ratio 03/17/2022 5  0 - 5 Final    LDL Cholesterol 03/17/2022 132.00  50.00 - 140.00 Final    Iron Level 03/17/2022 <5  50 - 170 Final    Transferrin 03/17/2022 346  180 - 382 Final    Iron Binding Capacity Total 03/17/2022 <354  250 - 450 Final    Iron Binding Capacity Unsaturated 03/17/2022 349  70 - 310 Final    Iron Saturation 03/17/2022 NA  20 - 50 Final    T3 Free 03/17/2022 3.03  1.58 - 3.91 Final       Assessment and Plan     1. Iron deficiency anemia due to chronic blood loss  Overview:  Heavy menstrual cycles for years   Initial iron level < 5 March 2022  Injectafer x 2 March/April 2022    Assessment & Plan:  Repeat CBC and Iron panel ordered, patient will complete at work at [facility] [employer]. Feels much better with energy levels.     Orders:  -     Ambulatory referral/consult to Obstetrics / Gynecology    2. Depression, recurrent  Overview:  In the past tried Celexa, Lexapro - stopped both after feeling better     Assessment & Plan:  Restart Lexapro at 10mg daily. Follow up 6 weeks with virtual      3. Positive depression screening  Comments:  I have reviewed the positive depression score which warrants active treatment with psychotherapy and/or medications.    4. Menorrhagia with regular cycle  Overview:  Longstanding very heavy menstrual cycles. Saw Dr Mary Aldrich to  discuss due to very low iron levels and was told not from her heavy cycles. Started on BC patch and it helped but she ran out and would like to discuss all of her options with a new GYN    Assessment & Plan:  Refer to Dr Josemanuel Negrete     Orders:  -     Ambulatory referral/consult to Obstetrics / Gynecology    Other orders  -     EScitalopram oxalate (LEXAPRO) 10 MG tablet           Follow up in about 6 weeks (around 10/3/2022) for Virtual Visit.       I have used clinical judgement based on duration and functional status to consider definite necessity for treatment.

## 2022-08-29 ENCOUNTER — PATIENT MESSAGE (OUTPATIENT)
Dept: FAMILY MEDICINE | Facility: CLINIC | Age: 32
End: 2022-08-29
Payer: COMMERCIAL

## 2022-08-29 ENCOUNTER — LAB VISIT (OUTPATIENT)
Dept: LAB | Facility: HOSPITAL | Age: 32
End: 2022-08-29
Attending: NURSE PRACTITIONER
Payer: COMMERCIAL

## 2022-08-29 DIAGNOSIS — D50.0 IRON DEFICIENCY ANEMIA DUE TO CHRONIC BLOOD LOSS: ICD-10-CM

## 2022-08-29 DIAGNOSIS — Z11.59 NEED FOR HEPATITIS C SCREENING TEST: ICD-10-CM

## 2022-08-29 LAB
BASOPHILS # BLD AUTO: 0.04 X10(3)/MCL (ref 0–0.2)
BASOPHILS NFR BLD AUTO: 0.5 %
EOSINOPHIL # BLD AUTO: 0.08 X10(3)/MCL (ref 0–0.9)
EOSINOPHIL NFR BLD AUTO: 1.1 %
ERYTHROCYTE [DISTWIDTH] IN BLOOD BY AUTOMATED COUNT: 11.7 % (ref 11.5–17)
HCT VFR BLD AUTO: 40.3 % (ref 37–47)
HCV AB SERPL QL IA: NONREACTIVE
HGB BLD-MCNC: 13.1 GM/DL (ref 12–16)
IMM GRANULOCYTES # BLD AUTO: 0.01 X10(3)/MCL (ref 0–0.04)
IMM GRANULOCYTES NFR BLD AUTO: 0.1 %
IRON SATN MFR SERPL: 28 % (ref 20–50)
IRON SERPL-MCNC: 74 UG/DL (ref 50–170)
LYMPHOCYTES # BLD AUTO: 2.9 X10(3)/MCL (ref 0.6–4.6)
LYMPHOCYTES NFR BLD AUTO: 39.5 %
MCH RBC QN AUTO: 28.5 PG (ref 27–31)
MCHC RBC AUTO-ENTMCNC: 32.5 MG/DL (ref 33–36)
MCV RBC AUTO: 87.6 FL (ref 80–94)
MONOCYTES # BLD AUTO: 0.65 X10(3)/MCL (ref 0.1–1.3)
MONOCYTES NFR BLD AUTO: 8.9 %
NEUTROPHILS # BLD AUTO: 3.7 X10(3)/MCL (ref 2.1–9.2)
NEUTROPHILS NFR BLD AUTO: 49.9 %
PLATELET # BLD AUTO: 285 X10(3)/MCL (ref 130–400)
PMV BLD AUTO: 10.1 FL (ref 7.4–10.4)
RBC # BLD AUTO: 4.6 X10(6)/MCL (ref 4.2–5.4)
TIBC SERPL-MCNC: 193 UG/DL (ref 70–310)
TIBC SERPL-MCNC: 267 UG/DL (ref 250–450)
TRANSFERRIN SERPL-MCNC: 231 MG/DL (ref 180–382)
WBC # SPEC AUTO: 7.3 X10(3)/MCL (ref 4.5–11.5)

## 2022-08-29 PROCEDURE — 86803 HEPATITIS C AB TEST: CPT

## 2022-08-29 PROCEDURE — 85025 COMPLETE CBC W/AUTO DIFF WBC: CPT

## 2022-08-29 PROCEDURE — 36415 COLL VENOUS BLD VENIPUNCTURE: CPT

## 2022-08-29 PROCEDURE — 83540 ASSAY OF IRON: CPT

## 2022-08-31 ENCOUNTER — PATIENT MESSAGE (OUTPATIENT)
Dept: FAMILY MEDICINE | Facility: CLINIC | Age: 32
End: 2022-08-31
Payer: COMMERCIAL

## 2022-09-25 ENCOUNTER — PATIENT MESSAGE (OUTPATIENT)
Dept: FAMILY MEDICINE | Facility: CLINIC | Age: 32
End: 2022-09-25
Payer: COMMERCIAL

## 2022-09-26 ENCOUNTER — PATIENT MESSAGE (OUTPATIENT)
Dept: FAMILY MEDICINE | Facility: CLINIC | Age: 32
End: 2022-09-26
Payer: COMMERCIAL

## 2022-09-27 ENCOUNTER — TELEPHONE (OUTPATIENT)
Dept: FAMILY MEDICINE | Facility: CLINIC | Age: 32
End: 2022-09-27
Payer: COMMERCIAL

## 2022-09-27 NOTE — TELEPHONE ENCOUNTER
No answer/left message on 9/27/22 at 12:00 reminding patient about upcoming telemed visit with instructions. No labs needed for visit.

## 2022-10-04 ENCOUNTER — DOCUMENTATION ONLY (OUTPATIENT)
Dept: ADMINISTRATIVE | Facility: HOSPITAL | Age: 32
End: 2022-10-04
Payer: COMMERCIAL

## 2022-10-20 ENCOUNTER — OFFICE VISIT (OUTPATIENT)
Dept: FAMILY MEDICINE | Facility: CLINIC | Age: 32
End: 2022-10-20
Payer: COMMERCIAL

## 2022-10-20 VITALS
WEIGHT: 180 LBS | BODY MASS INDEX: 28.25 KG/M2 | SYSTOLIC BLOOD PRESSURE: 114 MMHG | HEIGHT: 67 IN | DIASTOLIC BLOOD PRESSURE: 76 MMHG

## 2022-10-20 DIAGNOSIS — F33.9 DEPRESSION, RECURRENT: Primary | ICD-10-CM

## 2022-10-20 PROCEDURE — 1159F PR MEDICATION LIST DOCUMENTED IN MEDICAL RECORD: ICD-10-PCS | Mod: CPTII,95,, | Performed by: NURSE PRACTITIONER

## 2022-10-20 PROCEDURE — 3078F PR MOST RECENT DIASTOLIC BLOOD PRESSURE < 80 MM HG: ICD-10-PCS | Mod: CPTII,95,, | Performed by: NURSE PRACTITIONER

## 2022-10-20 PROCEDURE — 99212 OFFICE O/P EST SF 10 MIN: CPT | Mod: 95,,, | Performed by: NURSE PRACTITIONER

## 2022-10-20 PROCEDURE — 1160F PR REVIEW ALL MEDS BY PRESCRIBER/CLIN PHARMACIST DOCUMENTED: ICD-10-PCS | Mod: CPTII,95,, | Performed by: NURSE PRACTITIONER

## 2022-10-20 PROCEDURE — 3074F PR MOST RECENT SYSTOLIC BLOOD PRESSURE < 130 MM HG: ICD-10-PCS | Mod: CPTII,95,, | Performed by: NURSE PRACTITIONER

## 2022-10-20 PROCEDURE — 99212 PR OFFICE/OUTPT VISIT, EST, LEVL II, 10-19 MIN: ICD-10-PCS | Mod: 95,,, | Performed by: NURSE PRACTITIONER

## 2022-10-20 PROCEDURE — 1160F RVW MEDS BY RX/DR IN RCRD: CPT | Mod: CPTII,95,, | Performed by: NURSE PRACTITIONER

## 2022-10-20 PROCEDURE — 1159F MED LIST DOCD IN RCRD: CPT | Mod: CPTII,95,, | Performed by: NURSE PRACTITIONER

## 2022-10-20 PROCEDURE — 3074F SYST BP LT 130 MM HG: CPT | Mod: CPTII,95,, | Performed by: NURSE PRACTITIONER

## 2022-10-20 PROCEDURE — 3078F DIAST BP <80 MM HG: CPT | Mod: CPTII,95,, | Performed by: NURSE PRACTITIONER

## 2022-10-20 RX ORDER — BUPROPION HYDROCHLORIDE 150 MG/1
150 TABLET ORAL DAILY
Qty: 30 TABLET | Refills: 11 | Status: SHIPPED | OUTPATIENT
Start: 2022-10-20 | End: 2023-12-04

## 2022-10-20 NOTE — PROGRESS NOTES
TELEMEDICINE VISIT     Patient ID: Linda Almanzar is a 32 y.o. female.  MRN: [id number]  : 1990    Subjective:        TELEMEDICINE  The patient location is: home  The chief complaint leading to consultation is: Depression (6 week f/u)     Visit type: Virtual visit with synchronous audio and video    Total time spent with patient: 15 minutes  15 minutes of total time spent on the encounter, which includes face to face time and non-face to face time preparing to see the patient (eg, review of tests), obtaining and/or reviewing separately obtained history, documenting clinical information in the electronic or other health record, independently interpreting results (not separately reported) and communicating results to the patient/family/caregiver, or care coordination (not separately reported).    Each patient to whom he or she provides medical services by telemedicine is:  (1) informed of the relationship between the physician and patient and the respective role of any other health care provider with respect to management of the patient; and (2) notified that he or she may decline to receive medical services by telemedicine and may withdraw from such care at any time.    HPI: HPI   This is a 32-year-old white female who is seen today via virtual visit for 6 week follow-up for depression.  Patient states that the Lexapro made her yawn uncontrollably and also made her very nauseated.  She discontinued it.    Health maintenance reviewed with the patient.  Health maintenance completed:  Health Maintenance Topics with due status: Not Due       Topic Last Completion Date    TETANUS VACCINE 2020    Cervical Cancer Screening 2021      Health maintenance due:  Health Maintenance Due   Topic Date Due    COVID-19 Vaccine (3 - Booster for Pfizer series) 10/26/2021      ROS:  Review of Systems   Constitutional: Negative.  Negative for activity change and unexpected weight change.   HENT: Negative.  Negative  "for hearing loss, rhinorrhea and trouble swallowing.    Eyes: Negative.  Negative for discharge and visual disturbance.   Respiratory: Negative.  Negative for chest tightness and wheezing.    Cardiovascular: Negative.  Negative for chest pain and palpitations.   Gastrointestinal: Negative.  Negative for blood in stool, constipation, diarrhea and vomiting.   Endocrine: Negative.  Negative for polydipsia and polyuria.   Genitourinary: Negative.  Negative for difficulty urinating, dysuria, hematuria and menstrual problem.   Musculoskeletal: Negative.  Negative for arthralgias, joint swelling and neck pain.   Integumentary:  Negative.   Allergic/Immunologic: Negative.    Neurological: Negative.  Negative for weakness and headaches.   Hematological: Negative.    Psychiatric/Behavioral:  Positive for dysphoric mood. Negative for confusion.    All other systems reviewed and are negative.   Complete ROS negative except as stated in HPI  History:     Past Medical History:   Diagnosis Date    Depression     Hypothyroidism, unspecified     Iron deficiency anemia       Past Surgical History:   Procedure Laterality Date     SECTION  2019    DILATION AND CURETTAGE OF UTERUS  2012    FRACTURE SURGERY      OPEN REDUCTION AND INTERNAL FIXATION (ORIF) OF FRACTURE OF DISTAL RADIUS  09/10/2021     Family History   Problem Relation Age of Onset    Hypothyroidism Mother     Arthritis Mother     Endocrine tumor Sister       Social History     Tobacco Use    Smoking status: Never    Smokeless tobacco: Never   Substance and Sexual Activity    Alcohol use: Never    Drug use: Never    Sexual activity: Yes     Partners: Male     Birth control/protection: Condom          Allergies: Review of patient's allergies indicates:  No Known Allergies  Objective:     Vitals:    10/20/22 1004   BP: 114/76   Weight: 81.6 kg (180 lb)   Height: 5' 7" (1.702 m)   PainSc: 0-No pain         Physical Examination:   Physical " Exam  Vitals and nursing note reviewed.   Constitutional:       Appearance: Normal appearance.   HENT:      Head: Normocephalic and atraumatic.   Neurological:      General: No focal deficit present.      Mental Status: She is alert and oriented to person, place, and time.   Psychiatric:         Mood and Affect: Mood normal.         Behavior: Behavior normal.         Thought Content: Thought content normal.         Judgment: Judgment normal.         Medications:     Medication List with Changes/Refills   New Medications    BUPROPION (WELLBUTRIN XL) 150 MG TB24 TABLET    Take 1 tablet (150 mg total) by mouth once daily.   Current Medications    FERROUS SULFATE (SLOW FE ORAL)    Take by mouth.    TRIAMCINOLONE ACETONIDE 0.025% (KENALOG) 0.025 % CREAM    Apply topically 2 (two) times daily.   Discontinued Medications    ESCITALOPRAM OXALATE (LEXAPRO) 10 MG TABLET    Take 1 tablet (10 mg total) by mouth once daily.     Assessment and Plan       ICD-10-CM ICD-9-CM   1. Depression, recurrent  F33.9 296.30     1. Depression, recurrent  Overview:  In the past tried Celexa, Lexapro - stopped both after feeling better   08/22/2022 - Lexapro 10 mg daily  10/20/2022 - DC Lexapro due to to nausea. Start Wellbutrin  mg daily.     Assessment & Plan:  Discontinued Lexapro due to nausea and constant yawning.  Trial of Wellbutrin 150 mg daily, follow-up in 4-6 weeks with virtual visit.    Orders:  -     buPROPion (WELLBUTRIN XL) 150 MG TB24 tablet; Take 1 tablet (150 mg total) by mouth once daily.  Dispense: 30 tablet; Refill: 11              Follow Up:   Follow up in about 4 weeks (around 11/17/2022).    I spent greater than 15 minutes today both in chart review and greater than 50% of that time in discussion with the patient regarding health maintenance, diagnoses, diagnostic tests, medications, treatments, symptom management, expected results and adverse effects. Patient verbalized understanding and all questions were  answered.

## 2022-10-20 NOTE — ASSESSMENT & PLAN NOTE
Discontinued Lexapro due to nausea and constant yawning.  Trial of Wellbutrin 150 mg daily, follow-up in 4-6 weeks with virtual visit.

## 2022-11-10 ENCOUNTER — TELEPHONE (OUTPATIENT)
Dept: FAMILY MEDICINE | Facility: CLINIC | Age: 32
End: 2022-11-10
Payer: COMMERCIAL

## 2022-11-10 NOTE — TELEPHONE ENCOUNTER
No answer/left message on 11/10/22 at 9:50 reminding patient about upcoming telemedicine visit with instructions.

## 2022-12-17 ENCOUNTER — CLINICAL SUPPORT (OUTPATIENT)
Dept: URGENT CARE | Facility: CLINIC | Age: 32
End: 2022-12-17
Payer: COMMERCIAL

## 2022-12-17 VITALS — BODY MASS INDEX: 28.25 KG/M2 | WEIGHT: 180 LBS | TEMPERATURE: 99 F | HEIGHT: 67 IN

## 2022-12-17 DIAGNOSIS — R05.9 COUGH, UNSPECIFIED TYPE: Primary | ICD-10-CM

## 2022-12-17 LAB
CTP QC/QA: YES
CTP QC/QA: YES
POC MOLECULAR INFLUENZA A AGN: POSITIVE
POC MOLECULAR INFLUENZA B AGN: NEGATIVE
SARS-COV-2 RDRP RESP QL NAA+PROBE: NEGATIVE

## 2022-12-17 PROCEDURE — 87502 POCT INFLUENZA A/B MOLECULAR: ICD-10-PCS | Mod: QW,,, | Performed by: FAMILY MEDICINE

## 2022-12-17 PROCEDURE — 87635: ICD-10-PCS | Mod: QW,,, | Performed by: FAMILY MEDICINE

## 2022-12-17 PROCEDURE — 87502 INFLUENZA DNA AMP PROBE: CPT | Mod: QW,,, | Performed by: FAMILY MEDICINE

## 2022-12-17 PROCEDURE — 87635 SARS-COV-2 COVID-19 AMP PRB: CPT | Mod: QW,,, | Performed by: FAMILY MEDICINE

## 2022-12-17 NOTE — PROGRESS NOTES
Subjective:       Patient ID: Linda Almanzar is a 32 y.o. female.    Vitals:  vitals were not taken for this visit.     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 Result is positive for Flu A, negative for Flu B and negative for Covid. Pt advised, verbalized understanding.    Cough    Respiratory:  Positive for cough.      Objective:      Physical Exam      Assessment:       No diagnosis found.      Plan:         There are no diagnoses linked to this encounter.

## 2023-03-14 ENCOUNTER — TELEPHONE (OUTPATIENT)
Dept: FAMILY MEDICINE | Facility: CLINIC | Age: 33
End: 2023-03-14
Payer: COMMERCIAL

## 2023-03-14 DIAGNOSIS — Z00.00 ANNUAL PHYSICAL EXAM: Primary | ICD-10-CM

## 2023-03-14 DIAGNOSIS — D50.0 IRON DEFICIENCY ANEMIA DUE TO CHRONIC BLOOD LOSS: ICD-10-CM

## 2023-03-14 NOTE — TELEPHONE ENCOUNTER
No answer/left message on 3/14/23 at 8:37 reminding patient about upcoming visit with labs needed prior to appointment.      Please order labs needed for wellness visit

## 2023-06-14 ENCOUNTER — PATIENT MESSAGE (OUTPATIENT)
Dept: FAMILY MEDICINE | Facility: CLINIC | Age: 33
End: 2023-06-14
Payer: COMMERCIAL

## 2023-06-22 ENCOUNTER — TELEPHONE (OUTPATIENT)
Dept: FAMILY MEDICINE | Facility: CLINIC | Age: 33
End: 2023-06-22
Payer: COMMERCIAL

## 2023-06-22 NOTE — TELEPHONE ENCOUNTER
No answer/left message on 6/22/23 at 9:10 reminding patient about her upcoming visit with labs needed prior to appointment. Lab orders are in the system.

## 2023-06-30 ENCOUNTER — CLINICAL SUPPORT (OUTPATIENT)
Dept: URGENT CARE | Facility: CLINIC | Age: 33
End: 2023-06-30

## 2023-06-30 DIAGNOSIS — R05.9 COUGH, UNSPECIFIED TYPE: Primary | ICD-10-CM

## 2023-06-30 NOTE — PROGRESS NOTES
Employee exposed to Covid 19 Virus; sx consists of nasal congestion and cough. Confirmed positive testing in clinic on 06/30/2023. Unable to document results; HR order pending. Spoke with  Vidal Martínez, she states that order may be released by HR within the next business day.

## 2023-09-26 ENCOUNTER — PATIENT MESSAGE (OUTPATIENT)
Dept: FAMILY MEDICINE | Facility: CLINIC | Age: 33
End: 2023-09-26
Payer: COMMERCIAL

## 2023-09-26 ENCOUNTER — TELEPHONE (OUTPATIENT)
Dept: FAMILY MEDICINE | Facility: CLINIC | Age: 33
End: 2023-09-26
Payer: COMMERCIAL

## 2023-09-26 NOTE — TELEPHONE ENCOUNTER
No answer/left message on 9/26/23 at 10:55 reminding patient to complete her labs prior to her upcoming appointment.  Message sent to patient through portal.

## 2023-11-08 ENCOUNTER — TELEPHONE (OUTPATIENT)
Dept: FAMILY MEDICINE | Facility: CLINIC | Age: 33
End: 2023-11-08
Payer: COMMERCIAL

## 2023-11-08 NOTE — TELEPHONE ENCOUNTER
No answer on 11/8/23 at 11:58. Left message reminding patient about her upcoming visit with labs prior to visit.

## 2023-11-24 ENCOUNTER — LAB VISIT (OUTPATIENT)
Dept: LAB | Facility: HOSPITAL | Age: 33
End: 2023-11-24
Attending: NURSE PRACTITIONER
Payer: COMMERCIAL

## 2023-11-24 DIAGNOSIS — Z00.00 ANNUAL PHYSICAL EXAM: ICD-10-CM

## 2023-11-24 DIAGNOSIS — D50.0 IRON DEFICIENCY ANEMIA DUE TO CHRONIC BLOOD LOSS: ICD-10-CM

## 2023-11-24 LAB
ALBUMIN SERPL-MCNC: 4.2 G/DL (ref 3.5–5)
ALBUMIN/GLOB SERPL: 1.6 RATIO (ref 1.1–2)
ALP SERPL-CCNC: 44 UNIT/L (ref 40–150)
ALT SERPL-CCNC: 13 UNIT/L (ref 0–55)
AST SERPL-CCNC: 13 UNIT/L (ref 5–34)
BASOPHILS # BLD AUTO: 0.04 X10(3)/MCL
BASOPHILS NFR BLD AUTO: 0.7 %
BILIRUB SERPL-MCNC: 0.6 MG/DL
BUN SERPL-MCNC: 11.2 MG/DL (ref 7–18.7)
CALCIUM SERPL-MCNC: 9.2 MG/DL (ref 8.4–10.2)
CHLORIDE SERPL-SCNC: 107 MMOL/L (ref 98–107)
CHOLEST SERPL-MCNC: 195 MG/DL
CHOLEST/HDLC SERPL: 5 {RATIO} (ref 0–5)
CO2 SERPL-SCNC: 25 MMOL/L (ref 22–29)
CREAT SERPL-MCNC: 0.71 MG/DL (ref 0.55–1.02)
EOSINOPHIL # BLD AUTO: 0.05 X10(3)/MCL (ref 0–0.9)
EOSINOPHIL NFR BLD AUTO: 0.8 %
ERYTHROCYTE [DISTWIDTH] IN BLOOD BY AUTOMATED COUNT: 12.4 % (ref 11.5–17)
EST. AVERAGE GLUCOSE BLD GHB EST-MCNC: 111.2 MG/DL
GFR SERPLBLD CREATININE-BSD FMLA CKD-EPI: >60 MLS/MIN/1.73/M2
GLOBULIN SER-MCNC: 2.6 GM/DL (ref 2.4–3.5)
GLUCOSE SERPL-MCNC: 88 MG/DL (ref 74–100)
HBA1C MFR BLD: 5.5 %
HCT VFR BLD AUTO: 40.6 % (ref 37–47)
HDLC SERPL-MCNC: 39 MG/DL (ref 35–60)
HGB BLD-MCNC: 12.6 G/DL (ref 12–16)
IMM GRANULOCYTES # BLD AUTO: 0.01 X10(3)/MCL (ref 0–0.04)
IMM GRANULOCYTES NFR BLD AUTO: 0.2 %
IRON SATN MFR SERPL: 12 % (ref 20–50)
IRON SERPL-MCNC: 36 UG/DL (ref 50–170)
LDLC SERPL CALC-MCNC: 140 MG/DL (ref 50–140)
LYMPHOCYTES # BLD AUTO: 1.93 X10(3)/MCL (ref 0.6–4.6)
LYMPHOCYTES NFR BLD AUTO: 31.8 %
MCH RBC QN AUTO: 27 PG (ref 27–31)
MCHC RBC AUTO-ENTMCNC: 31 G/DL (ref 33–36)
MCV RBC AUTO: 87.1 FL (ref 80–94)
MONOCYTES # BLD AUTO: 0.42 X10(3)/MCL (ref 0.1–1.3)
MONOCYTES NFR BLD AUTO: 6.9 %
NEUTROPHILS # BLD AUTO: 3.61 X10(3)/MCL (ref 2.1–9.2)
NEUTROPHILS NFR BLD AUTO: 59.6 %
PLATELET # BLD AUTO: 358 X10(3)/MCL (ref 130–400)
PMV BLD AUTO: 11 FL (ref 7.4–10.4)
POTASSIUM SERPL-SCNC: 4 MMOL/L (ref 3.5–5.1)
PROT SERPL-MCNC: 6.8 GM/DL (ref 6.4–8.3)
RBC # BLD AUTO: 4.66 X10(6)/MCL (ref 4.2–5.4)
SODIUM SERPL-SCNC: 141 MMOL/L (ref 136–145)
TIBC SERPL-MCNC: 259 UG/DL (ref 70–310)
TIBC SERPL-MCNC: 295 UG/DL (ref 250–450)
TRANSFERRIN SERPL-MCNC: 286 MG/DL (ref 180–382)
TRIGL SERPL-MCNC: 82 MG/DL (ref 37–140)
TSH SERPL-ACNC: 1.36 UIU/ML (ref 0.35–4.94)
VLDLC SERPL CALC-MCNC: 16 MG/DL
WBC # SPEC AUTO: 6.06 X10(3)/MCL (ref 4.5–11.5)

## 2023-11-24 PROCEDURE — 80061 LIPID PANEL: CPT

## 2023-11-24 PROCEDURE — 80053 COMPREHEN METABOLIC PANEL: CPT

## 2023-11-24 PROCEDURE — 84443 ASSAY THYROID STIM HORMONE: CPT

## 2023-11-24 PROCEDURE — 85025 COMPLETE CBC W/AUTO DIFF WBC: CPT

## 2023-11-24 PROCEDURE — 36415 COLL VENOUS BLD VENIPUNCTURE: CPT

## 2023-11-24 PROCEDURE — 83036 HEMOGLOBIN GLYCOSYLATED A1C: CPT

## 2023-11-24 PROCEDURE — 83540 ASSAY OF IRON: CPT

## 2023-12-04 ENCOUNTER — OFFICE VISIT (OUTPATIENT)
Dept: FAMILY MEDICINE | Facility: CLINIC | Age: 33
End: 2023-12-04
Payer: COMMERCIAL

## 2023-12-04 VITALS
DIASTOLIC BLOOD PRESSURE: 76 MMHG | BODY MASS INDEX: 28 KG/M2 | HEART RATE: 69 BPM | SYSTOLIC BLOOD PRESSURE: 102 MMHG | HEIGHT: 67 IN | WEIGHT: 178.38 LBS | OXYGEN SATURATION: 100 % | TEMPERATURE: 98 F | RESPIRATION RATE: 16 BRPM

## 2023-12-04 DIAGNOSIS — N92.0 MENORRHAGIA WITH REGULAR CYCLE: ICD-10-CM

## 2023-12-04 DIAGNOSIS — Z00.00 ANNUAL PHYSICAL EXAM: Primary | ICD-10-CM

## 2023-12-04 DIAGNOSIS — F41.1 GENERALIZED ANXIETY DISORDER: ICD-10-CM

## 2023-12-04 DIAGNOSIS — F33.9 DEPRESSION, RECURRENT: ICD-10-CM

## 2023-12-04 DIAGNOSIS — D50.0 IRON DEFICIENCY ANEMIA DUE TO CHRONIC BLOOD LOSS: ICD-10-CM

## 2023-12-04 DIAGNOSIS — E03.9 HYPOTHYROIDISM, UNSPECIFIED TYPE: ICD-10-CM

## 2023-12-04 PROCEDURE — 1160F PR REVIEW ALL MEDS BY PRESCRIBER/CLIN PHARMACIST DOCUMENTED: ICD-10-PCS | Mod: CPTII,,, | Performed by: NURSE PRACTITIONER

## 2023-12-04 PROCEDURE — 3008F PR BODY MASS INDEX (BMI) DOCUMENTED: ICD-10-PCS | Mod: CPTII,,, | Performed by: NURSE PRACTITIONER

## 2023-12-04 PROCEDURE — 1159F PR MEDICATION LIST DOCUMENTED IN MEDICAL RECORD: ICD-10-PCS | Mod: CPTII,,, | Performed by: NURSE PRACTITIONER

## 2023-12-04 PROCEDURE — 99395 PREV VISIT EST AGE 18-39: CPT | Mod: ,,, | Performed by: NURSE PRACTITIONER

## 2023-12-04 PROCEDURE — 1159F MED LIST DOCD IN RCRD: CPT | Mod: CPTII,,, | Performed by: NURSE PRACTITIONER

## 2023-12-04 PROCEDURE — 3078F DIAST BP <80 MM HG: CPT | Mod: CPTII,,, | Performed by: NURSE PRACTITIONER

## 2023-12-04 PROCEDURE — 3078F PR MOST RECENT DIASTOLIC BLOOD PRESSURE < 80 MM HG: ICD-10-PCS | Mod: CPTII,,, | Performed by: NURSE PRACTITIONER

## 2023-12-04 PROCEDURE — 99395 PR PREVENTIVE VISIT,EST,18-39: ICD-10-PCS | Mod: ,,, | Performed by: NURSE PRACTITIONER

## 2023-12-04 PROCEDURE — 3074F SYST BP LT 130 MM HG: CPT | Mod: CPTII,,, | Performed by: NURSE PRACTITIONER

## 2023-12-04 PROCEDURE — 3044F PR MOST RECENT HEMOGLOBIN A1C LEVEL <7.0%: ICD-10-PCS | Mod: CPTII,,, | Performed by: NURSE PRACTITIONER

## 2023-12-04 PROCEDURE — 1160F RVW MEDS BY RX/DR IN RCRD: CPT | Mod: CPTII,,, | Performed by: NURSE PRACTITIONER

## 2023-12-04 PROCEDURE — 3074F PR MOST RECENT SYSTOLIC BLOOD PRESSURE < 130 MM HG: ICD-10-PCS | Mod: CPTII,,, | Performed by: NURSE PRACTITIONER

## 2023-12-04 PROCEDURE — 3008F BODY MASS INDEX DOCD: CPT | Mod: CPTII,,, | Performed by: NURSE PRACTITIONER

## 2023-12-04 PROCEDURE — 3044F HG A1C LEVEL LT 7.0%: CPT | Mod: CPTII,,, | Performed by: NURSE PRACTITIONER

## 2023-12-04 RX ORDER — BUSPIRONE HYDROCHLORIDE 10 MG/1
10 TABLET ORAL 2 TIMES DAILY
Qty: 60 TABLET | Refills: 11 | Status: SHIPPED | OUTPATIENT
Start: 2023-12-04 | End: 2024-12-03

## 2023-12-04 NOTE — ASSESSMENT & PLAN NOTE
Trial of buspirone 10 mg twice daily, follow-up 1 year.  Patient to send Lang[facility] message if she needs a dose change, will schedule a virtual visit if not helping.

## 2023-12-04 NOTE — ASSESSMENT & PLAN NOTE
Iron going back down, iron 36, iron saturation 12%.  H&H good at 12.6 and 40.6.  Patient only taking oral iron during her menstrual cycle due to GI upset.  Instructed to try and take oral iron at least twice weekly.  Has appointment scheduled with Dr. Box in April.  Instructed to let me know if she feels like her iron is getting very low again prior to this appointment and we will repeat her labs.  Patient verbalized understanding.

## 2023-12-04 NOTE — PROGRESS NOTES
Subjective:       Patient ID: Linda Almanzar is a 33 y.o. female.    Chief Complaint: Annual Exam      HPI   This is a 33-year-old white female who presents to clinic today for an annual wellness exam.  Patient reports overall she is doing okay.  States that she stopped her Wellbutrin at least 6 months ago.  States does not feel like she was ever truly depressed, thinks the depression was from her extremely low iron levels.  Her only complaint today is anxiousness.  She feels anxious all the time.  Feels worried about her kids all of the time.  Would like to try something just for anxiety.  Review of Systems  Comprehensive review of systems negative except as stated in HPI    The patient's Health Maintenance was reviewed and the following appears to be due:   There are no preventive care reminders to display for this patient.    Past Medical History:  Past Medical History:   Diagnosis Date    Depression     Hypothyroidism, unspecified     Iron deficiency anemia      Past Surgical History:   Procedure Laterality Date     SECTION  2019    DILATION AND CURETTAGE OF UTERUS  2012    FRACTURE SURGERY      OPEN REDUCTION AND INTERNAL FIXATION (ORIF) OF FRACTURE OF DISTAL RADIUS  09/10/2021     Review of patient's allergies indicates:  No Known Allergies  Current Outpatient Medications on File Prior to Visit   Medication Sig Dispense Refill    ferrous sulfate (SLOW FE ORAL) Take by mouth.      triamcinolone acetonide 0.025% (KENALOG) 0.025 % cream Apply topically 2 (two) times daily.      [DISCONTINUED] buPROPion (WELLBUTRIN XL) 150 MG TB24 tablet Take 1 tablet (150 mg total) by mouth once daily. 30 tablet 11     No current facility-administered medications on file prior to visit.     Social History     Socioeconomic History    Marital status:    Tobacco Use    Smoking status: Never     Passive exposure: Past    Smokeless tobacco: Never   Substance and Sexual Activity    Alcohol  "use: Never    Drug use: Never    Sexual activity: Yes     Partners: Male     Birth control/protection: Condom     Family History   Problem Relation Age of Onset    Hypothyroidism Mother     Arthritis Mother     Endocrine tumor Sister        Objective:       /76 (BP Location: Left arm)   Pulse 69   Temp 98 °F (36.7 °C) (Oral)   Resp 16   Ht 5' 7" (1.702 m)   Wt 80.9 kg (178 lb 6.4 oz)   LMP 11/18/2023 (Exact Date)   SpO2 100%   BMI 27.94 kg/m²      Physical Exam  Vitals and nursing note reviewed.   Constitutional:       Appearance: Normal appearance. She is normal weight.   HENT:      Head: Normocephalic and atraumatic.      Right Ear: Tympanic membrane, ear canal and external ear normal.      Left Ear: Tympanic membrane, ear canal and external ear normal.      Nose: Nose normal.      Mouth/Throat:      Mouth: Mucous membranes are moist.      Pharynx: Oropharynx is clear.   Eyes:      Extraocular Movements: Extraocular movements intact.      Conjunctiva/sclera: Conjunctivae normal.      Pupils: Pupils are equal, round, and reactive to light.   Cardiovascular:      Rate and Rhythm: Normal rate and regular rhythm.      Heart sounds: Normal heart sounds.   Pulmonary:      Effort: Pulmonary effort is normal.      Breath sounds: Normal breath sounds.   Abdominal:      General: Abdomen is flat. Bowel sounds are normal.      Palpations: Abdomen is soft.   Musculoskeletal:         General: Normal range of motion.      Cervical back: Normal range of motion and neck supple.   Skin:     General: Skin is warm and dry.   Neurological:      General: No focal deficit present.      Mental Status: She is alert and oriented to person, place, and time.   Psychiatric:         Mood and Affect: Mood normal.         Behavior: Behavior normal.         Thought Content: Thought content normal.         Judgment: Judgment normal.         Labs  Lab Visit on 11/24/2023   Component Date Value Ref Range Status    Sodium Level " 11/24/2023 141  136 - 145 mmol/L Final    Potassium Level 11/24/2023 4.0  3.5 - 5.1 mmol/L Final    Chloride 11/24/2023 107  98 - 107 mmol/L Final    Carbon Dioxide 11/24/2023 25  22 - 29 mmol/L Final    Glucose Level 11/24/2023 88  74 - 100 mg/dL Final    Blood Urea Nitrogen 11/24/2023 11.2  7.0 - 18.7 mg/dL Final    Creatinine 11/24/2023 0.71  0.55 - 1.02 mg/dL Final    Calcium Level Total 11/24/2023 9.2  8.4 - 10.2 mg/dL Final    Protein Total 11/24/2023 6.8  6.4 - 8.3 gm/dL Final    Albumin Level 11/24/2023 4.2  3.5 - 5.0 g/dL Final    Globulin 11/24/2023 2.6  2.4 - 3.5 gm/dL Final    Albumin/Globulin Ratio 11/24/2023 1.6  1.1 - 2.0 ratio Final    Bilirubin Total 11/24/2023 0.6  <=1.5 mg/dL Final    Alkaline Phosphatase 11/24/2023 44  40 - 150 unit/L Final    Alanine Aminotransferase 11/24/2023 13  0 - 55 unit/L Final    Aspartate Aminotransferase 11/24/2023 13  5 - 34 unit/L Final    eGFR 11/24/2023 >60  mls/min/1.73/m2 Final    Cholesterol Total 11/24/2023 195  <=200 mg/dL Final    HDL Cholesterol 11/24/2023 39  35 - 60 mg/dL Final    Triglyceride 11/24/2023 82  37 - 140 mg/dL Final    Cholesterol/HDL Ratio 11/24/2023 5  0 - 5 Final    Very Low Density Lipoprotein 11/24/2023 16   Final    LDL Cholesterol 11/24/2023 140.00  50.00 - 140.00 mg/dL Final    TSH 11/24/2023 1.356  0.350 - 4.940 uIU/mL Final    Hemoglobin A1c 11/24/2023 5.5  <=7.0 % Final    Estimated Average Glucose 11/24/2023 111.2  mg/dL Final    Iron Binding Capacity Unsaturated 11/24/2023 259  70 - 310 ug/dL Final    Iron Level 11/24/2023 36 (L)  50 - 170 ug/dL Final    Transferrin 11/24/2023 286  180 - 382 mg/dL Final    Iron Binding Capacity Total 11/24/2023 295  250 - 450 ug/dL Final    Iron Saturation 11/24/2023 12 (L)  20 - 50 % Final    WBC 11/24/2023 6.06  4.50 - 11.50 x10(3)/mcL Final    RBC 11/24/2023 4.66  4.20 - 5.40 x10(6)/mcL Final    Hgb 11/24/2023 12.6  12.0 - 16.0 g/dL Final    Hct 11/24/2023 40.6  37.0 - 47.0 % Final    MCV  11/24/2023 87.1  80.0 - 94.0 fL Final    MCH 11/24/2023 27.0  27.0 - 31.0 pg Final    MCHC 11/24/2023 31.0 (L)  33.0 - 36.0 g/dL Final    RDW 11/24/2023 12.4  11.5 - 17.0 % Final    Platelet 11/24/2023 358  130 - 400 x10(3)/mcL Final    MPV 11/24/2023 11.0 (H)  7.4 - 10.4 fL Final    Neut % 11/24/2023 59.6  % Final    Lymph % 11/24/2023 31.8  % Final    Mono % 11/24/2023 6.9  % Final    Eos % 11/24/2023 0.8  % Final    Basophil % 11/24/2023 0.7  % Final    Lymph # 11/24/2023 1.93  0.6 - 4.6 x10(3)/mcL Final    Neut # 11/24/2023 3.61  2.1 - 9.2 x10(3)/mcL Final    Mono # 11/24/2023 0.42  0.1 - 1.3 x10(3)/mcL Final    Eos # 11/24/2023 0.05  0 - 0.9 x10(3)/mcL Final    Baso # 11/24/2023 0.04  <=0.2 x10(3)/mcL Final    IG# 11/24/2023 0.01  0 - 0.04 x10(3)/mcL Final    IG% 11/24/2023 0.2  % Final       Assessment and Plan       ICD-10-CM ICD-9-CM   1. Annual physical exam  Z00.00 V70.0   2. Iron deficiency anemia due to chronic blood loss  D50.0 280.0   3. Hypothyroidism, unspecified type  E03.9 244.9   4. Menorrhagia with regular cycle  N92.0 626.2   5. Depression, recurrent  F33.9 296.30   6. Generalized anxiety disorder  F41.1 300.02        1. Annual physical exam  Overview:  Annual exam yearly in December    Assessment & Plan:  Labs reviewed in detail with patient, will repeat in 1 year.    Orders:  -     CBC Auto Differential; Future; Expected date: 12/04/2024  -     Comprehensive Metabolic Panel; Future; Expected date: 12/04/2024  -     Lipid Panel; Future; Expected date: 12/04/2024  -     TSH; Future; Expected date: 12/04/2024    2. Iron deficiency anemia due to chronic blood loss  Overview:  Heavy menstrual cycles for years   Initial iron level < 5 March 2022  Injectafer x 2 March/April 2022 11/24/2023 - iron 36, iron sat 12%    Assessment & Plan:  Iron going back down, iron 36, iron saturation 12%.  H&H good at 12.6 and 40.6.  Patient only taking oral iron during her menstrual cycle due to GI upset.  Instructed  to try and take oral iron at least twice weekly.  Has appointment scheduled with Dr. Box in April.  Instructed to let me know if she feels like her iron is getting very low again prior to this appointment and we will repeat her labs.  Patient verbalized understanding.    Orders:  -     Iron and TIBC; Future; Expected date: 12/04/2024    3. Hypothyroidism, unspecified type  Overview:  Diagnosed during a pregnancy, not on any medication    TSH yearly    Assessment & Plan:  TSH normal at 1.356, repeat 1 year.        4. Menorrhagia with regular cycle  Overview:  Longstanding very heavy menstrual cycles. Saw Dr Mary Aldrich to discuss very low iron levels and was told not from her heavy cycles.     Intolerant to oral iron due to GI issues     03/17/2022 - iron < 5  Injectafer on 03/23/2022 and 04/04/2022 08/29/2022 - iron 74, iron sat 28%  11/24/2023 - iron 36, iron sat 12%    Assessment & Plan:  Iron going back down, iron 36, iron saturation 12%.  H&H good at 12.6 and 40.6.  Patient only taking oral iron during her menstrual cycle due to GI upset.  Instructed to try and take oral iron at least twice weekly.  Has appointment scheduled with Dr. Box in April.  Instructed to let me know if she feels like her iron is getting very low again prior to this appointment and we will repeat her labs.  Patient verbalized understanding.      5. Depression, recurrent  Overview:  In the past tried Celexa, Lexapro - stopped both after feeling better   08/22/2022 - Lexapro 10 mg daily  10/20/2022 - DC Lexapro due to to nausea. Start Wellbutrin  mg daily.   June 2023 - stopped Wellbutrin    Assessment & Plan:  Patient has been off of the Wellbutrin now for at least 6 months.  Does not have any depression symptoms.  Feels like all of her depression was from her low iron.      6. Generalized anxiety disorder  Overview:  12/04/2023 - trial of buspirone 10 mg twice daily    Assessment & Plan:  Trial of buspirone 10 mg twice  daily, follow-up 1 year.  Patient to send [facility] message if she needs a dose change, will schedule a virtual visit if not helping.    Orders:  -     busPIRone (BUSPAR) 10 MG tablet; Take 1 tablet (10 mg total) by mouth 2 (two) times daily.  Dispense: 60 tablet; Refill: 11           Follow up in about 1 year (around 12/4/2024) for Annual.

## 2023-12-04 NOTE — ASSESSMENT & PLAN NOTE
Iron going back down, iron 36, iron saturation 12%.  H&H good at 12.6 and 40.6.  Patient only taking oral iron during her menstrual cycle due to GI upset.  Instructed to try and take oral iron at least twice weekly.  Has appointment scheduled with Dr. Bxo in April.  Instructed to let me know if she feels like her iron is getting very low again prior to this appointment and we will repeat her labs.  Patient verbalized understanding.

## 2023-12-04 NOTE — ASSESSMENT & PLAN NOTE
Patient has been off of the Wellbutrin now for at least 6 months.  Does not have any depression symptoms.  Feels like all of her depression was from her low iron.

## 2024-03-04 PROBLEM — Z00.00 ANNUAL PHYSICAL EXAM: Status: RESOLVED | Noted: 2023-12-04 | Resolved: 2024-03-04

## 2024-05-01 ENCOUNTER — LAB VISIT (OUTPATIENT)
Dept: LAB | Facility: HOSPITAL | Age: 34
End: 2024-05-01
Attending: OBSTETRICS & GYNECOLOGY
Payer: COMMERCIAL

## 2024-05-01 DIAGNOSIS — Z01.818 OTHER SPECIFIED PRE-OPERATIVE EXAMINATION: Primary | ICD-10-CM

## 2024-05-01 DIAGNOSIS — N93.9 ABNORMAL UTERINE BLEEDING (AUB): ICD-10-CM

## 2024-05-01 DIAGNOSIS — Z01.818 OTHER SPECIFIED PRE-OPERATIVE EXAMINATION: ICD-10-CM

## 2024-05-01 LAB
BASOPHILS # BLD AUTO: 0.05 X10(3)/MCL
BASOPHILS NFR BLD AUTO: 0.7 %
EOSINOPHIL # BLD AUTO: 0.05 X10(3)/MCL (ref 0–0.9)
EOSINOPHIL NFR BLD AUTO: 0.7 %
ERYTHROCYTE [DISTWIDTH] IN BLOOD BY AUTOMATED COUNT: 13.1 % (ref 11.5–17)
HCT VFR BLD AUTO: 39.7 % (ref 37–47)
HGB BLD-MCNC: 12.5 G/DL (ref 12–16)
IMM GRANULOCYTES # BLD AUTO: 0.02 X10(3)/MCL (ref 0–0.04)
IMM GRANULOCYTES NFR BLD AUTO: 0.3 %
LYMPHOCYTES # BLD AUTO: 2.13 X10(3)/MCL (ref 0.6–4.6)
LYMPHOCYTES NFR BLD AUTO: 27.8 %
MCH RBC QN AUTO: 26.7 PG (ref 27–31)
MCHC RBC AUTO-ENTMCNC: 31.5 G/DL (ref 33–36)
MCV RBC AUTO: 84.6 FL (ref 80–94)
MONOCYTES # BLD AUTO: 0.56 X10(3)/MCL (ref 0.1–1.3)
MONOCYTES NFR BLD AUTO: 7.3 %
NEUTROPHILS # BLD AUTO: 4.86 X10(3)/MCL (ref 2.1–9.2)
NEUTROPHILS NFR BLD AUTO: 63.2 %
NRBC BLD AUTO-RTO: 0 %
PLATELET # BLD AUTO: 288 X10(3)/MCL (ref 130–400)
PMV BLD AUTO: 10.7 FL (ref 7.4–10.4)
RBC # BLD AUTO: 4.69 X10(6)/MCL (ref 4.2–5.4)
WBC # SPEC AUTO: 7.67 X10(3)/MCL (ref 4.5–11.5)

## 2024-05-01 PROCEDURE — 36415 COLL VENOUS BLD VENIPUNCTURE: CPT

## 2024-05-02 NOTE — DISCHARGE INSTRUCTIONS
Patient Education     Discharge Instructions      What care is needed at home?   Rest for the first few days after the procedure. Avoid activities like heavy lifting and hard exercise.  You may shower 24 hours after the surgery. Proper washing will help prevent infection.  Avoid soaking in a bath or a hot tub until your doctor tells you it is safe to do so.  You can expect some bleeding from your vagina for a few weeks. You may use sanitary pads but not tampons.  Your doctor may give you a drug to help heal the lining of the womb. You may have to take the drug for a few weeks. Take the drug as ordered by your doctor.  You may need lubricants for sex after the procedure. Ask your doctor when it is safe for you to have sex.  What follow-up care is needed?   Be sure to keep your follow up visit.  Will physical activity be limited?   You may have to limit your activity. Ask your doctor when you may go back to your normal activities like working, driving, or sex.  What problems could happen?   Not able to get pregnant if the lining of the uterus was destroyed  Infection  Small hole in the uterus  Cuts on the cervix  Heavy blood loss  Blood clots  When do I need to call the doctor?   Signs of infection such as a fever of 100.4°F (38°C) or higher, chills, pain with passing urine, wound that will not heal, or anal itching.  Lots of blood in your sanitary pads or more than 6 soaked pads per day  Upset stomach, throwing up, or very bad belly pain  Trouble passing urine  Smelly green or dark yellow vaginal discharge  Cough, shortness of breath, trouble swallowing, or chest pain

## 2024-05-04 ENCOUNTER — PATIENT MESSAGE (OUTPATIENT)
Dept: ADMINISTRATIVE | Facility: OTHER | Age: 34
End: 2024-05-04
Payer: COMMERCIAL

## 2024-05-05 ENCOUNTER — PATIENT MESSAGE (OUTPATIENT)
Dept: ADMINISTRATIVE | Facility: OTHER | Age: 34
End: 2024-05-05
Payer: COMMERCIAL

## 2024-05-07 ENCOUNTER — HOSPITAL ENCOUNTER (OUTPATIENT)
Facility: HOSPITAL | Age: 34
Discharge: HOME | End: 2024-05-07
Attending: OBSTETRICS & GYNECOLOGY | Admitting: OBSTETRICS & GYNECOLOGY
Payer: COMMERCIAL

## 2024-05-07 ENCOUNTER — ANESTHESIA EVENT (OUTPATIENT)
Dept: SURGERY | Facility: HOSPITAL | Age: 34
End: 2024-05-07
Payer: COMMERCIAL

## 2024-05-07 ENCOUNTER — ANESTHESIA (OUTPATIENT)
Dept: SURGERY | Facility: HOSPITAL | Age: 34
End: 2024-05-07
Payer: COMMERCIAL

## 2024-05-07 DIAGNOSIS — N92.0 EXCESSIVE OR FREQUENT MENSTRUATION: ICD-10-CM

## 2024-05-07 DIAGNOSIS — N84.0 POLYP OF CORPUS UTERI: ICD-10-CM

## 2024-05-07 PROCEDURE — 71000016 HC POSTOP RECOV ADDL HR: Performed by: OBSTETRICS & GYNECOLOGY

## 2024-05-07 PROCEDURE — 37000009 HC ANESTHESIA EA ADD 15 MINS: Performed by: OBSTETRICS & GYNECOLOGY

## 2024-05-07 PROCEDURE — 37000008 HC ANESTHESIA 1ST 15 MINUTES: Performed by: OBSTETRICS & GYNECOLOGY

## 2024-05-07 PROCEDURE — 63600175 PHARM REV CODE 636 W HCPCS: Performed by: NURSE ANESTHETIST, CERTIFIED REGISTERED

## 2024-05-07 PROCEDURE — 36000707: Performed by: OBSTETRICS & GYNECOLOGY

## 2024-05-07 PROCEDURE — 63600175 PHARM REV CODE 636 W HCPCS: Performed by: ANESTHESIOLOGY

## 2024-05-07 PROCEDURE — 71000033 HC RECOVERY, INTIAL HOUR: Performed by: OBSTETRICS & GYNECOLOGY

## 2024-05-07 PROCEDURE — C1782 MORCELLATOR: HCPCS | Performed by: OBSTETRICS & GYNECOLOGY

## 2024-05-07 PROCEDURE — 25000003 PHARM REV CODE 250: Performed by: NURSE ANESTHETIST, CERTIFIED REGISTERED

## 2024-05-07 PROCEDURE — 36000706: Performed by: OBSTETRICS & GYNECOLOGY

## 2024-05-07 PROCEDURE — D9220A PRA ANESTHESIA: Mod: ANES,,, | Performed by: ANESTHESIOLOGY

## 2024-05-07 PROCEDURE — D9220A PRA ANESTHESIA: Mod: CRNA,,, | Performed by: NURSE ANESTHETIST, CERTIFIED REGISTERED

## 2024-05-07 PROCEDURE — 71000015 HC POSTOP RECOV 1ST HR: Performed by: OBSTETRICS & GYNECOLOGY

## 2024-05-07 RX ORDER — ONDANSETRON HYDROCHLORIDE 2 MG/ML
4 INJECTION, SOLUTION INTRAVENOUS DAILY PRN
Status: DISCONTINUED | OUTPATIENT
Start: 2024-05-07 | End: 2024-05-07 | Stop reason: HOSPADM

## 2024-05-07 RX ORDER — PROPOFOL 10 MG/ML
VIAL (ML) INTRAVENOUS
Status: DISCONTINUED | OUTPATIENT
Start: 2024-05-07 | End: 2024-05-07

## 2024-05-07 RX ORDER — METHOCARBAMOL 100 MG/ML
1000 INJECTION, SOLUTION INTRAMUSCULAR; INTRAVENOUS ONCE AS NEEDED
Status: COMPLETED | OUTPATIENT
Start: 2024-05-07 | End: 2024-05-07

## 2024-05-07 RX ORDER — HYDROMORPHONE HYDROCHLORIDE 2 MG/ML
0.4 INJECTION, SOLUTION INTRAMUSCULAR; INTRAVENOUS; SUBCUTANEOUS EVERY 5 MIN PRN
Status: ACTIVE | OUTPATIENT
Start: 2024-05-07 | End: 2024-05-07

## 2024-05-07 RX ORDER — FENTANYL CITRATE 50 UG/ML
INJECTION, SOLUTION INTRAMUSCULAR; INTRAVENOUS
Status: DISCONTINUED | OUTPATIENT
Start: 2024-05-07 | End: 2024-05-07

## 2024-05-07 RX ORDER — ONDANSETRON 4 MG/1
8 TABLET, ORALLY DISINTEGRATING ORAL EVERY 8 HOURS PRN
Status: DISCONTINUED | OUTPATIENT
Start: 2024-05-07 | End: 2024-05-07 | Stop reason: HOSPADM

## 2024-05-07 RX ORDER — HYDROMORPHONE HYDROCHLORIDE 2 MG/ML
0.2 INJECTION, SOLUTION INTRAMUSCULAR; INTRAVENOUS; SUBCUTANEOUS EVERY 5 MIN PRN
Status: DISCONTINUED | OUTPATIENT
Start: 2024-05-07 | End: 2024-05-07

## 2024-05-07 RX ORDER — SODIUM CHLORIDE 9 MG/ML
INJECTION, SOLUTION INTRAVENOUS CONTINUOUS
Status: DISCONTINUED | OUTPATIENT
Start: 2024-05-07 | End: 2024-05-07 | Stop reason: HOSPADM

## 2024-05-07 RX ORDER — SODIUM CHLORIDE, SODIUM GLUCONATE, SODIUM ACETATE, POTASSIUM CHLORIDE AND MAGNESIUM CHLORIDE 30; 37; 368; 526; 502 MG/100ML; MG/100ML; MG/100ML; MG/100ML; MG/100ML
INJECTION, SOLUTION INTRAVENOUS CONTINUOUS
Status: DISCONTINUED | OUTPATIENT
Start: 2024-05-07 | End: 2024-05-07 | Stop reason: HOSPADM

## 2024-05-07 RX ORDER — ACETAMINOPHEN 10 MG/ML
1000 INJECTION, SOLUTION INTRAVENOUS ONCE
Status: DISCONTINUED | OUTPATIENT
Start: 2024-05-07 | End: 2024-05-07 | Stop reason: HOSPADM

## 2024-05-07 RX ORDER — DEXAMETHASONE SODIUM PHOSPHATE 4 MG/ML
INJECTION, SOLUTION INTRA-ARTICULAR; INTRALESIONAL; INTRAMUSCULAR; INTRAVENOUS; SOFT TISSUE
Status: DISCONTINUED | OUTPATIENT
Start: 2024-05-07 | End: 2024-05-07

## 2024-05-07 RX ORDER — LIDOCAINE HYDROCHLORIDE 10 MG/ML
1 INJECTION, SOLUTION EPIDURAL; INFILTRATION; INTRACAUDAL; PERINEURAL ONCE
Status: DISCONTINUED | OUTPATIENT
Start: 2024-05-07 | End: 2024-05-07 | Stop reason: HOSPADM

## 2024-05-07 RX ORDER — MIDAZOLAM HYDROCHLORIDE 2 MG/2ML
2 INJECTION, SOLUTION INTRAMUSCULAR; INTRAVENOUS ONCE AS NEEDED
Status: COMPLETED | OUTPATIENT
Start: 2024-05-07 | End: 2024-05-07

## 2024-05-07 RX ORDER — KETOROLAC TROMETHAMINE 30 MG/ML
15 INJECTION, SOLUTION INTRAMUSCULAR; INTRAVENOUS EVERY 6 HOURS PRN
Status: DISCONTINUED | OUTPATIENT
Start: 2024-05-07 | End: 2024-05-07 | Stop reason: HOSPADM

## 2024-05-07 RX ORDER — LIDOCAINE HYDROCHLORIDE 10 MG/ML
INJECTION, SOLUTION EPIDURAL; INFILTRATION; INTRACAUDAL; PERINEURAL
Status: DISCONTINUED | OUTPATIENT
Start: 2024-05-07 | End: 2024-05-07

## 2024-05-07 RX ORDER — LIDOCAINE HYDROCHLORIDE 10 MG/ML
1 INJECTION, SOLUTION EPIDURAL; INFILTRATION; INTRACAUDAL; PERINEURAL ONCE AS NEEDED
Status: DISCONTINUED | OUTPATIENT
Start: 2024-05-07 | End: 2024-05-07 | Stop reason: HOSPADM

## 2024-05-07 RX ORDER — DEXMEDETOMIDINE HYDROCHLORIDE 100 UG/ML
INJECTION, SOLUTION INTRAVENOUS
Status: DISCONTINUED | OUTPATIENT
Start: 2024-05-07 | End: 2024-05-07

## 2024-05-07 RX ORDER — DIPHENHYDRAMINE HYDROCHLORIDE 50 MG/ML
25 INJECTION INTRAMUSCULAR; INTRAVENOUS EVERY 6 HOURS PRN
Status: DISCONTINUED | OUTPATIENT
Start: 2024-05-07 | End: 2024-05-07 | Stop reason: HOSPADM

## 2024-05-07 RX ADMIN — DEXMEDETOMIDINE 6 MCG: 200 INJECTION, SOLUTION INTRAVENOUS at 01:05

## 2024-05-07 RX ADMIN — SODIUM CHLORIDE, POTASSIUM CHLORIDE, SODIUM LACTATE AND CALCIUM CHLORIDE: 600; 310; 30; 20 INJECTION, SOLUTION INTRAVENOUS at 01:05

## 2024-05-07 RX ADMIN — FENTANYL CITRATE 50 MCG: 50 INJECTION, SOLUTION INTRAMUSCULAR; INTRAVENOUS at 01:05

## 2024-05-07 RX ADMIN — DEXMEDETOMIDINE 4 MCG: 200 INJECTION, SOLUTION INTRAVENOUS at 01:05

## 2024-05-07 RX ADMIN — ONDANSETRON 4 MG: 2 INJECTION INTRAMUSCULAR; INTRAVENOUS at 01:05

## 2024-05-07 RX ADMIN — PROPOFOL 200 MG: 10 INJECTION, EMULSION INTRAVENOUS at 01:05

## 2024-05-07 RX ADMIN — MIDAZOLAM HYDROCHLORIDE 2 MG: 1 INJECTION, SOLUTION INTRAMUSCULAR; INTRAVENOUS at 01:05

## 2024-05-07 RX ADMIN — METHOCARBAMOL 1000 MG: 100 INJECTION INTRAMUSCULAR; INTRAVENOUS at 02:05

## 2024-05-07 RX ADMIN — DEXAMETHASONE SODIUM PHOSPHATE 8 MG: 4 INJECTION, SOLUTION INTRA-ARTICULAR; INTRALESIONAL; INTRAMUSCULAR; INTRAVENOUS; SOFT TISSUE at 01:05

## 2024-05-07 RX ADMIN — SODIUM CHLORIDE, POTASSIUM CHLORIDE, SODIUM LACTATE AND CALCIUM CHLORIDE: 600; 310; 30; 20 INJECTION, SOLUTION INTRAVENOUS at 02:05

## 2024-05-07 RX ADMIN — LIDOCAINE HYDROCHLORIDE 20 MG: 10 INJECTION, SOLUTION EPIDURAL; INFILTRATION; INTRACAUDAL; PERINEURAL at 01:05

## 2024-05-07 NOTE — DISCHARGE SUMMARY
[facility] - Periop Services  Discharge Summary  OBGYN    Admission date: 5/7/2024  Discharge date: 05/07/2024    Admit Dx:      Patient Active Problem List   Diagnosis    Iron deficiency anemia due to chronic blood loss    Hypothyroidism    Depression, recurrent    Menorrhagia with regular cycle    Generalized anxiety disorder    Polyp of corpus uteri     Discharge Dx:    Patient Active Problem List   Diagnosis    Iron deficiency anemia due to chronic blood loss    Hypothyroidism    Depression, recurrent    Menorrhagia with regular cycle    Generalized anxiety disorder    Polyp of corpus uteri       Attending Physician: Sugey Jansen   Discharge Provider: Sugey Jansen    Procedures Performed: Procedure(s) (LRB):  HYSTEROSCOPY (N/A)  POLYPECTOMY (N/A)    Hospital Course: Patient was admitted on 5/7/2024 to undergo hysteroscopy, D&C secondary to abnormal uterine bleeding.  Procedure was uncomplicated, for full details see operative report. Barring any unforseen incidents, patient will be discharged home when she is able to ambulate, void, and tolerate PO intake.    Consults: none    Significant Diagnostic Studies:   Lab Results   Component Value Date    WBC 7.67 05/01/2024    HGB 12.5 05/01/2024    HCT 39.7 05/01/2024    MCV 84.6 05/01/2024     05/01/2024     X-Ray Wrist Complete Right  Please see provider office/clinic notes for radiology interpretation.                                   Date/Time: 11/15/2021 11:58    Electronically Signed By: Stella Gurrola  Date/Time Signed: 11/15/2021 11:58      Discharged Condition: stable    Disposition: Home    Follow Up:       Medications:  Current Discharge Medication List        CONTINUE these medications which have NOT CHANGED    Details   busPIRone (BUSPAR) 10 MG tablet Take 1 tablet (10 mg total) by mouth 2 (two) times daily.  Qty: 60 tablet, Refills: 11    Associated Diagnoses: Generalized anxiety disorder      ferrous sulfate (SLOW  FE ORAL) Take by mouth.      triamcinolone acetonide 0.025% (KENALOG) 0.025 % cream Apply topically 2 (two) times daily.             No discharge procedures on file.

## 2024-05-07 NOTE — ANESTHESIA POSTPROCEDURE EVALUATION
Anesthesia Post Evaluation    Patient: Linda Almanzar    Procedure(s) Performed: Procedure(s) (LRB):  HYSTEROSCOPY (N/A)  POLYPECTOMY (N/A)    Final Anesthesia Type: general      Patient location during evaluation: PACU  Patient participation: Yes- Able to Participate  Level of consciousness: awake and alert  Post-procedure vital signs: reviewed and stable  Pain management: adequate  Airway patency: patent      Anesthetic complications: no      Cardiovascular status: blood pressure returned to baseline  Respiratory status: unassisted  Hydration status: euvolemic  Follow-up not needed.              Vitals Value Taken Time   BP 89/57 05/07/24 1410   Temp  05/07/24 1411   Pulse 54 05/07/24 1411   Resp 11 05/07/24 1411   SpO2 100 % 05/07/24 1411   Vitals shown include unfiled device data.      No case tracking events are documented in the log.      Pain/Aurora Score: No data recorded

## 2024-05-07 NOTE — H&P
Pre-Operative History and Physical   Obstetrics and Gynecology    Linda Almanzar is a 33 y.o. female No obstetric history on file. for preop examination.  She is scheduled for a diagnostic hysteroscopy, possible polypectomy on 24.    ROS:  GENERAL: Denies weight gain or weight loss. Feeling well overall.   SKIN: Denies rash or lesions.   HEAD: Denies head injury or headache.   NODES: Denies enlarged lymph nodes.   CHEST: Denies chest pain or shortness of breath.   CARDIOVASCULAR: Denies palpitations or left sided chest pain.   ABDOMEN: No abdominal pain, constipation, diarrhea, nausea, vomiting or rectal bleeding.   URINARY: No frequency, dysuria, hematuria, or burning on urination.  REPRODUCTIVE: See HPI.   BREASTS: denies pain, lumps, or nipple discharge.   HEMATOLOGIC: No easy bruisability or excessive bleeding with the exception of menstrual cycles.  MUSCULOSKELETAL: Denies joint pain or swelling.   NEUROLOGIC: Denies syncope or weakness.   PSYCHIATRIC: Denies depression, anxiety or mood swings.    Past Medical History:   Diagnosis Date    Depression     Hypothyroidism, unspecified     Iron deficiency anemia      Past Surgical History:   Procedure Laterality Date     SECTION  2019    DILATION AND CURETTAGE OF UTERUS  2012    FRACTURE SURGERY      OPEN REDUCTION AND INTERNAL FIXATION (ORIF) OF FRACTURE OF DISTAL RADIUS  09/10/2021     Family History   Problem Relation Name Age of Onset    Hypothyroidism Mother Emmanuel     Arthritis Mother Emmanuel     Endocrine tumor Sister       Review of patient's allergies indicates:  No Known Allergies    Current Facility-Administered Medications:     0.9%  NaCl infusion, , Intravenous, Continuous, Sugey Jansen MD    electrolyte-A infusion, , Intravenous, Continuous, Sugey Jansen MD    LIDOcaine (PF) 10 mg/ml (1%) injection 10 mg, 1 mL, Intradermal, Once PRN, Sugey Jansen MD    LIDOcaine (PF) 10 mg/ml (1%)  injection 10 mg, 1 mL, Intradermal, Once, Sugey Jansen MD  No outpatient medications have been marked as taking for the 5/7/24 encounter (Hospital Encounter).     Social History     Tobacco Use    Smoking status: Never     Passive exposure: Past    Smokeless tobacco: Never   Substance Use Topics    Alcohol use: Never    Drug use: Never         Vitals:    05/07/24 1113   BP: 114/78   Pulse: 82   Temp: 98 °F (36.7 °C)     General Appearance: no acute distress, alert and oriented   Cardiovascular Exam: regular rate and rhythm  Gastrointestinal Exam: clear to auscultation bilaterally      Assessment: DUB with thickened endometrial lining, suspect polyp    Plan: diagnostic hysteroscopy, possible polypectomy  I have discussed the risks, benefits, indications, and alternatives of the procedure in detail.  The patient verbalizes her understanding.  All questions answered.  Consents signed.  The patient agrees to proceed to proceed as planned.

## 2024-05-07 NOTE — CARE UPDATE
Patient awakened,brock,rejected oral airway, oriented/reassured her, she denied c/o, exchanging well.

## 2024-05-07 NOTE — ANESTHESIA PROCEDURE NOTES
Intubation    Date/Time: 5/7/2024 1:31 PM    Performed by: Jyoti Anaya CRNA  Authorized by: Rene Bradley MD    Intubation:     Induction:  Intravenous    Intubated:  Postinduction    Mask Ventilation:  Easy mask    Attempts:  1    Attempted By:  CRNA    Difficult Airway Encountered?: No      Airway Device:  Supraglottic airway/LMA    Airway Device Size:  3.0    Style/Cuff Inflation:  Cuffed (inflated to minimal occlusive pressure)    Endotracheal tube placement: self inflating cuff.    Placement Verified By:  Capnometry    Complicating Factors:  None    Findings Post-Intubation:  BS equal bilateral

## 2024-05-07 NOTE — TRANSFER OF CARE
Anesthesia Transfer of Care Note    Patient: Linda Almanzar    Procedure(s) Performed: Procedure(s) (LRB):  HYSTEROSCOPY (N/A)  POLYPECTOMY (N/A)    Patient location: PACU    Anesthesia Type: general    Transport from OR: Transported from OR on room air with adequate spontaneous ventilation    Post pain: adequate analgesia    Post assessment: no apparent anesthetic complications and tolerated procedure well    Post vital signs: stable    Level of consciousness: sedated    Nausea/Vomiting: no nausea/vomiting    Complications: none    Transfer of care protocol was followed

## 2024-05-07 NOTE — ANESTHESIA PREPROCEDURE EVALUATION
05/07/2024  Linda Almanzar is a 33 y.o., female with excessive menstruation and uterine polyp for hysteroscopy and polypectomy.  She is feeling well otherwise, denies cardiopulmonary complaints.      Pre-op Assessment    I have reviewed the Patient Summary Reports.     I have reviewed the Nursing Notes. I have reviewed the NPO Status.   I have reviewed the Medications.     Review of Systems  Anesthesia Hx:  No problems with previous Anesthesia             Denies Family Hx of Anesthesia complications.    Denies Personal Hx of Anesthesia complications.                    Cardiovascular:  Cardiovascular Normal Exercise tolerance: good          Denies Angina.                                  Pulmonary:  Pulmonary Normal      Denies Shortness of breath.                  Endocrine:   Hypothyroidism          Psych:  Psychiatric History  depression                Physical Exam  General: Well nourished, Cooperative, Alert and Oriented    Airway:  Mallampati: II   Mouth Opening: Normal  TM Distance: Normal  Tongue: Normal  Neck ROM: Normal ROM    Dental:  Intact    Chest/Lungs:  Clear to auscultation, Normal Respiratory Rate    Heart:  Rate: Normal  Rhythm: Regular Rhythm        Anesthesia Plan  Type of Anesthesia, risks & benefits discussed:    Anesthesia Type: Gen Supraglottic Airway  Intra-op Monitoring Plan: Standard ASA Monitors  Post Op Pain Control Plan: multimodal analgesia and IV/PO Opioids PRN  Induction:  IV  Airway Plan: , Post-Induction  Informed Consent: Informed consent signed with the Patient and all parties understand the risks and agree with anesthesia plan.  All questions answered.   ASA Score: 2  Day of Surgery Review of History & Physical: H&P Update referred to the surgeon/provider.    Ready For Surgery From Anesthesia Perspective.     .

## 2024-05-08 ENCOUNTER — PATIENT MESSAGE (OUTPATIENT)
Dept: ADMINISTRATIVE | Facility: OTHER | Age: 34
End: 2024-05-08
Payer: COMMERCIAL

## 2024-05-08 VITALS
BODY MASS INDEX: 27.27 KG/M2 | DIASTOLIC BLOOD PRESSURE: 79 MMHG | SYSTOLIC BLOOD PRESSURE: 115 MMHG | WEIGHT: 173.75 LBS | HEART RATE: 75 BPM | TEMPERATURE: 98 F | RESPIRATION RATE: 18 BRPM | OXYGEN SATURATION: 100 % | HEIGHT: 67 IN

## 2024-05-09 ENCOUNTER — PATIENT MESSAGE (OUTPATIENT)
Dept: ADMINISTRATIVE | Facility: OTHER | Age: 34
End: 2024-05-09
Payer: COMMERCIAL

## 2024-05-09 LAB — PSYCHE PATHOLOGY RESULT: NORMAL

## 2024-11-06 ENCOUNTER — CLINICAL SUPPORT (OUTPATIENT)
Dept: URGENT CARE | Facility: CLINIC | Age: 34
End: 2024-11-06
Payer: COMMERCIAL

## 2024-11-06 VITALS — RESPIRATION RATE: 16 BRPM

## 2024-11-06 DIAGNOSIS — Z11.1 ENCOUNTER FOR PPD TEST: Primary | ICD-10-CM

## 2024-11-06 NOTE — PROGRESS NOTES
Subjective:      Patient ID: Linda Almanzar is a 34 y.o. female.    Vitals:  vitals were not taken for this visit.     Chief Complaint: No chief complaint on file.     Patient is a 34 y.o. female who presents to urgent care for routine ppd test.   ROS   Objective:     Physical Exam    Assessment:     1. Encounter for PPD test        Plan:       Encounter for PPD test  -     POCT TB Skin Test Read

## 2024-11-25 ENCOUNTER — PATIENT MESSAGE (OUTPATIENT)
Dept: FAMILY MEDICINE | Facility: CLINIC | Age: 34
End: 2024-11-25
Payer: COMMERCIAL

## 2024-12-12 ENCOUNTER — PATIENT MESSAGE (OUTPATIENT)
Dept: FAMILY MEDICINE | Facility: CLINIC | Age: 34
End: 2024-12-12

## 2025-02-10 ENCOUNTER — OFFICE VISIT (OUTPATIENT)
Dept: URGENT CARE | Facility: CLINIC | Age: 35
End: 2025-02-10
Payer: COMMERCIAL

## 2025-02-10 VITALS
WEIGHT: 178 LBS | HEIGHT: 67 IN | OXYGEN SATURATION: 99 % | SYSTOLIC BLOOD PRESSURE: 119 MMHG | DIASTOLIC BLOOD PRESSURE: 82 MMHG | HEART RATE: 81 BPM | TEMPERATURE: 98 F | RESPIRATION RATE: 18 BRPM | BODY MASS INDEX: 27.94 KG/M2

## 2025-02-10 DIAGNOSIS — M25.512 ACUTE PAIN OF LEFT SHOULDER: Primary | ICD-10-CM

## 2025-02-10 PROCEDURE — 99213 OFFICE O/P EST LOW 20 MIN: CPT | Mod: ,,, | Performed by: NURSE PRACTITIONER

## 2025-02-10 RX ORDER — CYCLOBENZAPRINE HCL 10 MG
10 TABLET ORAL 3 TIMES DAILY PRN
Qty: 21 TABLET | Refills: 0 | Status: SHIPPED | OUTPATIENT
Start: 2025-02-10 | End: 2025-02-17

## 2025-02-10 RX ORDER — PREDNISONE 20 MG/1
20 TABLET ORAL 2 TIMES DAILY
Qty: 10 TABLET | Refills: 0 | Status: SHIPPED | OUTPATIENT
Start: 2025-02-10 | End: 2025-02-15

## 2025-02-10 RX ORDER — KETOROLAC TROMETHAMINE 10 MG/1
10 TABLET, FILM COATED ORAL EVERY 6 HOURS
Qty: 20 TABLET | Refills: 0 | Status: SHIPPED | OUTPATIENT
Start: 2025-02-10 | End: 2025-02-15

## 2025-02-10 NOTE — PROGRESS NOTES
Subjective:      Patient ID: Linda Almanzar is a 34 y.o. female.    Vitals:  vitals were not taken for this visit.     Chief Complaint: Shoulder Pain     Patient is a 34 y.o. female who presents to urgent care with complaints of left shoulder pain x2 weeks, worse x 3 days. Alleviating factors include Voltaren gel, Salonpas, Motrin, Heat with no relief. Patient denies trauma.      Musculoskeletal:  Positive for pain, joint pain and abnormal ROM of joint. Negative for trauma and joint swelling.    Objective:     Physical Exam   Constitutional: She is oriented to person, place, and time. She appears well-developed. She is cooperative.  Non-toxic appearance. She does not appear ill. No distress.   HENT:   Head: Normocephalic and atraumatic.   Ears:   Right Ear: Hearing, tympanic membrane, external ear and ear canal normal.   Left Ear: Hearing, tympanic membrane, external ear and ear canal normal.   Nose: Nose normal. No mucosal edema, rhinorrhea or nasal deformity. No epistaxis. Right sinus exhibits no maxillary sinus tenderness and no frontal sinus tenderness. Left sinus exhibits no maxillary sinus tenderness and no frontal sinus tenderness.   Mouth/Throat: Uvula is midline, oropharynx is clear and moist and mucous membranes are normal. No trismus in the jaw. Normal dentition. No uvula swelling. No oropharyngeal exudate, posterior oropharyngeal edema or posterior oropharyngeal erythema.   Eyes: Conjunctivae and lids are normal. No scleral icterus.   Neck: Trachea normal and phonation normal. Neck supple. No edema present. No erythema present. No neck rigidity present.   Cardiovascular: Normal rate, regular rhythm, normal heart sounds and normal pulses.   Pulmonary/Chest: Effort normal and breath sounds normal. No respiratory distress. She has no decreased breath sounds. She has no rhonchi.   Abdominal: Normal appearance.   Musculoskeletal:         General: No deformity.      Left shoulder: She exhibits decreased  range of motion and tenderness. She exhibits no swelling, no effusion, no crepitus and no deformity.        Arms:    Neurological: She is alert and oriented to person, place, and time. She exhibits normal muscle tone. Coordination normal.   Skin: Skin is warm, dry, intact, not diaphoretic and not pale.   Psychiatric: Her speech is normal and behavior is normal. Judgment and thought content normal.   Nursing note and vitals reviewed.    Assessment:     No diagnosis found.    Plan:       There are no diagnoses linked to this encounter.

## 2025-03-13 ENCOUNTER — PATIENT MESSAGE (OUTPATIENT)
Dept: FAMILY MEDICINE | Facility: CLINIC | Age: 35
End: 2025-03-13
Payer: COMMERCIAL

## 2025-03-20 PROBLEM — M25.512 ACUTE PAIN OF LEFT SHOULDER: Status: ACTIVE | Noted: 2025-03-20

## (undated) DEVICE — DRAPE LAVH SURG 109X109X100IN

## (undated) DEVICE — TRAY SKIN SCRUB WET PREMIUM

## (undated) DEVICE — DEVICE MYOSURE LITE TISS REM

## (undated) DEVICE — GOWN POLY REINF BRTH SLV XL

## (undated) DEVICE — COVER PROXIMA MAYO STAND

## (undated) DEVICE — Device

## (undated) DEVICE — SOL NORMAL USPCA 0.9%

## (undated) DEVICE — SEAL LENS SCOPE MYOSURE

## (undated) DEVICE — PAD CURITY MATERNITY PERI

## (undated) DEVICE — GLOVE SIGNATURE ESSNTL LTX 6.5

## (undated) DEVICE — TIP YANKAUERS BULB NO VENT

## (undated) DEVICE — SOL NACL IRR 3000ML

## (undated) DEVICE — SPONGE GAUZE 16PLY 4X4

## (undated) DEVICE — SOL NACL IRR 1000ML BTL

## (undated) DEVICE — SUPPORT ULNA NERVE PROTECTOR

## (undated) DEVICE — GLOVE SENSICARE PI SURG 6.5

## (undated) DEVICE — DRAPE UND BUTT W/POUCH 40X44IN

## (undated) DEVICE — TOWEL OR DISP STRL BLUE 4/PK

## (undated) DEVICE — PAD PREP CUFFED NS 24X48IN

## (undated) DEVICE — COVER HANDLE LIGHT RIGID

## (undated) DEVICE — TUBE SUCTION MEDI-VAC STERILE

## (undated) DEVICE — DRESSING TELFA N ADH 3X8

## (undated) DEVICE — GLOVE SENSICARE PI MICRO 5.5